# Patient Record
Sex: FEMALE | Race: WHITE | NOT HISPANIC OR LATINO | Employment: FULL TIME | URBAN - METROPOLITAN AREA
[De-identification: names, ages, dates, MRNs, and addresses within clinical notes are randomized per-mention and may not be internally consistent; named-entity substitution may affect disease eponyms.]

---

## 2017-05-24 ENCOUNTER — OFFICE VISIT (OUTPATIENT)
Dept: URGENT CARE | Age: 47
End: 2017-05-24
Payer: COMMERCIAL

## 2017-05-24 PROCEDURE — 99213 OFFICE O/P EST LOW 20 MIN: CPT | Performed by: FAMILY MEDICINE

## 2018-01-21 ENCOUNTER — OFFICE VISIT (OUTPATIENT)
Dept: URGENT CARE | Age: 48
End: 2018-01-21
Payer: COMMERCIAL

## 2018-01-21 ENCOUNTER — TRANSCRIBE ORDERS (OUTPATIENT)
Dept: ADMINISTRATIVE | Age: 48
End: 2018-01-21

## 2018-01-21 ENCOUNTER — APPOINTMENT (OUTPATIENT)
Dept: RADIOLOGY | Age: 48
End: 2018-01-21
Payer: COMMERCIAL

## 2018-01-21 DIAGNOSIS — R05.9 COUGH: Primary | ICD-10-CM

## 2018-01-21 DIAGNOSIS — R05.9 COUGH: ICD-10-CM

## 2018-01-21 PROCEDURE — 99203 OFFICE O/P NEW LOW 30 MIN: CPT | Performed by: FAMILY MEDICINE

## 2018-01-21 PROCEDURE — 71046 X-RAY EXAM CHEST 2 VIEWS: CPT

## 2018-01-22 NOTE — PROGRESS NOTES
Assessment   1  Dizziness (780 4) (R42)   2  Acute URI (465 9) (J06 9)    Plan   Acute URI    · Doxycycline Hyclate 100 MG Oral Capsule; TAKE 1 CAPSULE EVERY 12 HOURS    DAILY    Discussion/Summary   Discussion Summary:    URI: Patient was treated by her PCP on Friday with Augmentin  She is unable to finish the course due to diarrhea, stomach upset and dizziness  Advised her she can stop the medication and will start doxycycline 100 mg twice daily for 10 days  She has a side effect that she is to follow up with her primary care doctor  Understands and agrees with treatment plan: The treatment plan was reviewed with the patient/guardian  The patient/guardian understands and agrees with the treatment plan      Chief Complaint   Chief Complaint Free Text Note Form: last week had sore throat , cough, -fevers  went to doctor they gave abx  CXR RX   bad diarrhea/dizzyness--probable r/t abx  History of Present Illness   HPI: Patient presents with sensitivity to Augmentin  She states she saw her PCP on Friday who put her on Augmentin for an upper respiratory infection  She also ordered a chest x-ray which was done today  Patient states she is already starting to feel better as far as her sore throat and cough  But she is experiencing diarrhea, stomach upset and dizziness  She feels she is unable to tolerate the rest of the Augmentin  She is here requesting a different antibiotic  Hospital Based Practices Required Assessment:      Abuse And Domestic Violence Screen       Yes, the patient is safe at home  -- The patient states no one is hurting them  Depression And Suicide Screen  No, the patient has not had thoughts of hurting themself  No, the patient has not felt depressed in the past 7 days  Review of Systems   Focused-Female:      Constitutional: fever,-- feeling poorly-- and-- feeling tired  ENT: sore throat, but-- no earache        Respiratory: cough, but-- no shortness of breath-- and-- no wheezing  Gastrointestinal: nausea-- and-- diarrhea, but-- no abdominal pain-- and-- no vomiting  Active Problems   1  Migraine (346 90) (G43 909)   2  Sinusitis (473 9) (J32 9)    Past Medical History   1  History of Herniated vertebral disc (722 2)    Social History    · Never a smoker   · No alcohol use   · No drug use    Surgical History   1  History of Umbilical Hernia Repair    Current Meds    1  Cardizem SOLR;     Therapy: (Recorded:[id number]) to Recorded   2  Cymbalta CPEP; Therapy: (Recorded:[id number]) to Recorded   3  Lo Loestrin Fe 1 MG-10 MCG / 10 MCG Oral Tablet; Therapy: (Recorded:51Adr2684) to Recorded   4  Wellbutrin  MG Oral Tablet Extended Release 12 Hour; Therapy: (Recorded:20Tun1194) to Recorded    Allergies   1  Bactrim DS TABS   2  Cipro TABS    Vitals   Signs   Recorded: 21Jan2018 02:55PM   Temperature: 98 F  Heart Rate: 89  Respiration: 20  Systolic: 929  Diastolic: 78  Height: 5 ft 9 in  Weight: 158 lb   BMI Calculated: 23 33  BSA Calculated: 1 87  O2 Saturation: 98  Pain Scale: 2    Physical Exam        Constitutional      General appearance: No acute distress, well appearing and well nourished  Eyes      Conjunctiva and lids: No swelling, erythema or discharge  Ears, Nose, Mouth, and Throat      External inspection of ears and nose: Normal        Otoscopic examination: Tympanic membranes translucent with normal light reflex  Canals patent without erythema  Oropharynx: Normal with no erythema, edema, exudate or lesions  Pulmonary      Respiratory effort: No increased work of breathing or signs of respiratory distress  Auscultation of lungs: Clear to auscultation  Cardiovascular      Auscultation of heart: Normal rate and rhythm, normal S1 and S2, without murmurs         Signatures    Electronically signed by : BLANCHE Armenta; Jan 21 2018  3:31PM EST                       (Author)     Electronically signed by : Denis Butler SARAH Bertrand ; Jan 21 2018  3:57PM EST

## 2020-07-17 ENCOUNTER — OFFICE VISIT (OUTPATIENT)
Dept: ENDOCRINOLOGY | Facility: CLINIC | Age: 50
End: 2020-07-17
Payer: COMMERCIAL

## 2020-07-17 VITALS
WEIGHT: 150 LBS | HEART RATE: 97 BPM | BODY MASS INDEX: 22.22 KG/M2 | SYSTOLIC BLOOD PRESSURE: 132 MMHG | HEIGHT: 69 IN | DIASTOLIC BLOOD PRESSURE: 62 MMHG

## 2020-07-17 DIAGNOSIS — E05.90 HYPERTHYROIDISM: Primary | ICD-10-CM

## 2020-07-17 DIAGNOSIS — R51.9 CHRONIC NONINTRACTABLE HEADACHE, UNSPECIFIED HEADACHE TYPE: ICD-10-CM

## 2020-07-17 DIAGNOSIS — R74.01 TRANSAMINITIS: ICD-10-CM

## 2020-07-17 DIAGNOSIS — G89.29 CHRONIC NONINTRACTABLE HEADACHE, UNSPECIFIED HEADACHE TYPE: ICD-10-CM

## 2020-07-17 PROCEDURE — 99203 OFFICE O/P NEW LOW 30 MIN: CPT | Performed by: INTERNAL MEDICINE

## 2020-07-17 RX ORDER — GABAPENTIN 100 MG/1
CAPSULE ORAL
COMMUNITY
Start: 2017-12-08 | End: 2020-07-17 | Stop reason: ALTCHOICE

## 2020-07-17 RX ORDER — DULOXETIN HYDROCHLORIDE 60 MG/1
60 CAPSULE, DELAYED RELEASE ORAL DAILY
COMMUNITY
Start: 2014-02-24

## 2020-07-17 RX ORDER — DULOXETIN HYDROCHLORIDE 30 MG/1
CAPSULE, DELAYED RELEASE ORAL
COMMUNITY
End: 2020-07-17 | Stop reason: ALTCHOICE

## 2020-07-17 RX ORDER — BUPROPION HYDROCHLORIDE 100 MG/1
TABLET, EXTENDED RELEASE ORAL
COMMUNITY
End: 2020-07-17 | Stop reason: ALTCHOICE

## 2020-07-17 RX ORDER — BUTALBITAL, ACETAMINOPHEN AND CAFFEINE 50; 325; 40 MG/1; MG/1; MG/1
TABLET ORAL AS NEEDED
COMMUNITY
Start: 2011-09-27

## 2020-07-17 RX ORDER — ACETAZOLAMIDE 250 MG/1
TABLET ORAL
COMMUNITY
Start: 2017-12-21 | End: 2020-07-17 | Stop reason: SINTOL

## 2020-07-17 RX ORDER — METHIMAZOLE 10 MG/1
10 TABLET ORAL DAILY
Qty: 90 TABLET | Refills: 1 | Status: SHIPPED | OUTPATIENT
Start: 2020-07-17 | End: 2021-01-18 | Stop reason: DRUGHIGH

## 2020-07-17 RX ORDER — BUPROPION HYDROCHLORIDE 300 MG/1
300 TABLET ORAL
COMMUNITY

## 2020-07-17 RX ORDER — NICOTINE POLACRILEX 4 MG/1
GUM, CHEWING ORAL AS NEEDED
COMMUNITY
Start: 2015-10-06

## 2020-07-17 RX ORDER — METOPROLOL SUCCINATE 25 MG/1
12.5 TABLET, EXTENDED RELEASE ORAL DAILY
Qty: 90 TABLET | Refills: 1 | Status: SHIPPED | OUTPATIENT
Start: 2020-07-17 | End: 2021-01-28

## 2020-07-17 RX ORDER — DICLOFENAC SODIUM 75 MG/1
75 TABLET, DELAYED RELEASE ORAL
COMMUNITY
Start: 2012-06-15 | End: 2020-07-17 | Stop reason: ALTCHOICE

## 2020-07-17 RX ORDER — NORETHINDRONE ACETATE AND ETHINYL ESTRADIOL AND FERROUS FUMARATE 1MG-20(24)
KIT ORAL
COMMUNITY
Start: 2012-06-15 | End: 2020-07-17 | Stop reason: ALTCHOICE

## 2020-07-17 RX ORDER — AMOXICILLIN 500 MG/1
CAPSULE ORAL
COMMUNITY
Start: 2011-03-15 | End: 2020-07-17 | Stop reason: ALTCHOICE

## 2020-07-17 RX ORDER — DILTIAZEM HYDROCHLORIDE 100 MG/1
INJECTION, POWDER, LYOPHILIZED, FOR SOLUTION INTRAVENOUS
COMMUNITY
End: 2020-07-17 | Stop reason: ALTCHOICE

## 2020-07-17 RX ORDER — HYDROCORTISONE 25 MG/ML
2.5 LOTION TOPICAL AS NEEDED
COMMUNITY
Start: 2014-02-24

## 2020-07-17 NOTE — PATIENT INSTRUCTIONS
Start methimazole 10mg once a day for decreasing thyroid hormone level    Start metoprolol 12 5mg extended release for your heart rate    Schedule the uptake scan- stop methimazole 5 days prior to getting scan done    Have repeat labs done in 4 weeks    Follow up in 5 weeks

## 2020-07-17 NOTE — PROGRESS NOTES
New Patient Progress Note      CC: hyperthyroid    History of Present Illness:    49-year-old female with 3-4 month history of tremulousness, unintentional weight loss, episodic palpitations, dry skin, fogginess of hot and fatigue  She had a TSH done in May 2020 that was undetectable when she went to ER with symptoms of chest pain and palpitations  She denies any smoking history, family history of Graves disease, exposure to radiation, throat pain, fever, chills, difficulty swallowing or speech, No painful eye movements  She does not report similar episodes in the past     No History of external radiation to head/neck/chest   No recent Iodine loading in form of medication, erbs or kelp supplements or radiological diagnostic studies  Family Hx of thyroid cancers:  No    There is no problem list on file for this patient  Past Medical History:   Diagnosis Date    Migraines       History reviewed  No pertinent surgical history  Family History   Problem Relation Age of Onset   You Poliotilia Breast cancer Mother     Diabetes type II Maternal Grandmother      Social History     Tobacco Use    Smoking status: Never Smoker    Smokeless tobacco: Never Used   Substance Use Topics    Alcohol use: Yes     Comment: socially     Allergies   Allergen Reactions    Augmentin Es-600  [Amoxicillin-Pot Clavulanate] Diarrhea and Dizziness    Ciprofloxacin     Sulfamethoxazole-Trimethoprim      dizzy         Review of Systems   Constitutional: Positive for fatigue  HENT: Negative  Eyes: Negative  Respiratory: Negative  Cardiovascular: Negative  Gastrointestinal: Negative  Endocrine: Positive for heat intolerance  Musculoskeletal: Negative  Skin: Negative  Allergic/Immunologic: Negative  Neurological: Negative  Hematological: Negative  Psychiatric/Behavioral: Negative  All other systems reviewed and are negative  Physical Exam:  Body mass index is 22 15 kg/m²    /62   Pulse 97 Ht 5' 9" (1 753 m)   Wt 68 kg (150 lb)   BMI 22 15 kg/m²        Physical Exam   Constitutional: She is oriented to person, place, and time  She appears well-developed  HENT:   Head: Normocephalic  Mouth/Throat: Oropharynx is clear and moist    Eyes: Pupils are equal, round, and reactive to light  Neck: Normal range of motion  Very mild thyromegaly without tenderness or bruit   Cardiovascular: Normal rate and normal heart sounds  Pulmonary/Chest: Effort normal and breath sounds normal    Abdominal: Soft  Bowel sounds are normal    Musculoskeletal: She exhibits no edema or deformity  Bilateral fine tremor of hands   Neurological: She is alert and oriented to person, place, and time  Mild bilateral hyperreflexia   Skin: Capillary refill takes less than 2 seconds  No rash noted  No pallor  Psychiatric: She has a normal mood and affect  Labs:    TSH <0 007 uIU per mL   free T4 3 09 ng/dL  Thyroid peroxidase antibodies positive   AST 48 IU/L   ALT 67 IU/L    Impression:  1  Hyperthyroidism    2  Chronic nonintractable headache, unspecified headache type         Plan:    Diagnoses and all orders for this visit:    Hyperthyroidism  She has clinical and biochemical hyperthyroidism associated with positive TPO antibodies suggestive of Graves disease  Will start her on both a beta-blocker and methimazole for symptom management  Differentials including Graves disease, thyroiditis and toxic nodule were discussed  Options for definitive management including radioiodine ablation and surgery were also discussed  She was advised to get repeat labs as listed in 4 weeks and will be seen in office in 4 weeks time     -     TSH, 3rd generation; Future  -     T4, free; Future  -     T3; Future  -     Comprehensive metabolic panel; Future  -     CBC and differential Lab Collect; Future  -     Thyroid stimulating immunoglobulin; Future  -     NM thyroid imaging w uptake(s);  Future  -     metoprolol succinate (TOPROL-XL) 25 mg 24 hr tablet; Take 0 5 tablets (12 5 mg total) by mouth daily  -     ABO/Rh; Future  -     methimazole (TAPAZOLE) 10 mg tablet; Take 1 tablet (10 mg total) by mouth daily     Transaminitis  This is mild and most likely associated with Graves disease  Will need repeat CMP closely for follow-up in context of starting methimazole  Chronic nonintractable headache, unspecified headache type  -       Advised to follow-up with neurology  Discussed with the patient and all questioned fully answered  She will call me if any problems arise  Educated/ Counseled patient on diagnostic test results, prognosis, risk vs benefit of treatment options, importance of treatment compliance, healthy life and lifestyle choices        David Hayes

## 2020-12-23 ENCOUNTER — NURSE TRIAGE (OUTPATIENT)
Dept: OTHER | Facility: OTHER | Age: 50
End: 2020-12-23

## 2020-12-23 ENCOUNTER — OFFICE VISIT (OUTPATIENT)
Dept: URGENT CARE | Age: 50
End: 2020-12-23
Payer: COMMERCIAL

## 2020-12-23 VITALS — BODY MASS INDEX: 23.99 KG/M2 | WEIGHT: 162 LBS | HEIGHT: 69 IN

## 2020-12-23 DIAGNOSIS — R05.9 COUGH: Primary | ICD-10-CM

## 2020-12-23 DIAGNOSIS — J02.9 SORE THROAT: ICD-10-CM

## 2020-12-23 DIAGNOSIS — R51.9 ACUTE NONINTRACTABLE HEADACHE, UNSPECIFIED HEADACHE TYPE: ICD-10-CM

## 2020-12-23 DIAGNOSIS — Z20.822 EXPOSURE TO COVID-19 VIRUS: ICD-10-CM

## 2020-12-23 DIAGNOSIS — R53.83 FATIGUE, UNSPECIFIED TYPE: ICD-10-CM

## 2020-12-23 PROCEDURE — U0003 INFECTIOUS AGENT DETECTION BY NUCLEIC ACID (DNA OR RNA); SEVERE ACUTE RESPIRATORY SYNDROME CORONAVIRUS 2 (SARS-COV-2) (CORONAVIRUS DISEASE [COVID-19]), AMPLIFIED PROBE TECHNIQUE, MAKING USE OF HIGH THROUGHPUT TECHNOLOGIES AS DESCRIBED BY CMS-2020-01-R: HCPCS | Performed by: PHYSICIAN ASSISTANT

## 2020-12-23 PROCEDURE — 99213 OFFICE O/P EST LOW 20 MIN: CPT | Performed by: PHYSICIAN ASSISTANT

## 2020-12-25 LAB — SARS-COV-2 RNA SPEC QL NAA+PROBE: NOT DETECTED

## 2021-01-01 DIAGNOSIS — R51.9 CHRONIC NONINTRACTABLE HEADACHE, UNSPECIFIED HEADACHE TYPE: ICD-10-CM

## 2021-01-01 DIAGNOSIS — E05.90 HYPERTHYROIDISM: ICD-10-CM

## 2021-01-01 DIAGNOSIS — G89.29 CHRONIC NONINTRACTABLE HEADACHE, UNSPECIFIED HEADACHE TYPE: ICD-10-CM

## 2021-01-04 ENCOUNTER — TELEPHONE (OUTPATIENT)
Dept: ENDOCRINOLOGY | Facility: CLINIC | Age: 51
End: 2021-01-04

## 2021-01-04 DIAGNOSIS — E05.90 HYPERTHYROIDISM: Primary | ICD-10-CM

## 2021-01-04 RX ORDER — METHIMAZOLE 10 MG/1
TABLET ORAL
Qty: 30 TABLET | Refills: 5 | OUTPATIENT
Start: 2021-01-04

## 2021-01-15 ENCOUNTER — TRANSCRIBE ORDERS (OUTPATIENT)
Dept: LAB | Facility: HOSPITAL | Age: 51
End: 2021-01-15

## 2021-01-15 ENCOUNTER — TELEPHONE (OUTPATIENT)
Dept: ENDOCRINOLOGY | Facility: CLINIC | Age: 51
End: 2021-01-15

## 2021-01-15 ENCOUNTER — LAB (OUTPATIENT)
Dept: LAB | Facility: HOSPITAL | Age: 51
End: 2021-01-15
Attending: INTERNAL MEDICINE
Payer: COMMERCIAL

## 2021-01-15 DIAGNOSIS — E05.90 PRETIBIAL MYXEDEMA: Primary | ICD-10-CM

## 2021-01-15 DIAGNOSIS — E05.90 HYPERTHYROIDISM: ICD-10-CM

## 2021-01-15 LAB
ALBUMIN SERPL BCP-MCNC: 4.1 G/DL (ref 3.5–5)
ALP SERPL-CCNC: 128 U/L (ref 46–116)
ALT SERPL W P-5'-P-CCNC: 39 U/L (ref 12–78)
ANION GAP SERPL CALCULATED.3IONS-SCNC: 2 MMOL/L (ref 4–13)
AST SERPL W P-5'-P-CCNC: 19 U/L (ref 5–45)
BASOPHILS # BLD AUTO: 0.08 THOUSANDS/ΜL (ref 0–0.1)
BASOPHILS NFR BLD AUTO: 1 % (ref 0–1)
BILIRUB SERPL-MCNC: 0.33 MG/DL (ref 0.2–1)
BUN SERPL-MCNC: 14 MG/DL (ref 5–25)
CALCIUM SERPL-MCNC: 9.7 MG/DL (ref 8.3–10.1)
CHLORIDE SERPL-SCNC: 107 MMOL/L (ref 100–108)
CO2 SERPL-SCNC: 28 MMOL/L (ref 21–32)
CREAT SERPL-MCNC: 0.88 MG/DL (ref 0.6–1.3)
EOSINOPHIL # BLD AUTO: 0.41 THOUSAND/ΜL (ref 0–0.61)
EOSINOPHIL NFR BLD AUTO: 6 % (ref 0–6)
ERYTHROCYTE [DISTWIDTH] IN BLOOD BY AUTOMATED COUNT: 12.4 % (ref 11.6–15.1)
GFR SERPL CREATININE-BSD FRML MDRD: 76 ML/MIN/1.73SQ M
GLUCOSE P FAST SERPL-MCNC: 99 MG/DL (ref 65–99)
HCT VFR BLD AUTO: 41.3 % (ref 34.8–46.1)
HGB BLD-MCNC: 13.3 G/DL (ref 11.5–15.4)
IMM GRANULOCYTES # BLD AUTO: 0.01 THOUSAND/UL (ref 0–0.2)
IMM GRANULOCYTES NFR BLD AUTO: 0 % (ref 0–2)
LYMPHOCYTES # BLD AUTO: 2.3 THOUSANDS/ΜL (ref 0.6–4.47)
LYMPHOCYTES NFR BLD AUTO: 33 % (ref 14–44)
MCH RBC QN AUTO: 30.6 PG (ref 26.8–34.3)
MCHC RBC AUTO-ENTMCNC: 32.2 G/DL (ref 31.4–37.4)
MCV RBC AUTO: 95 FL (ref 82–98)
MONOCYTES # BLD AUTO: 0.4 THOUSAND/ΜL (ref 0.17–1.22)
MONOCYTES NFR BLD AUTO: 6 % (ref 4–12)
NEUTROPHILS # BLD AUTO: 3.74 THOUSANDS/ΜL (ref 1.85–7.62)
NEUTS SEG NFR BLD AUTO: 54 % (ref 43–75)
NRBC BLD AUTO-RTO: 0 /100 WBCS
PLATELET # BLD AUTO: 461 THOUSANDS/UL (ref 149–390)
PMV BLD AUTO: 9.8 FL (ref 8.9–12.7)
POTASSIUM SERPL-SCNC: 4.2 MMOL/L (ref 3.5–5.3)
PROT SERPL-MCNC: 7.7 G/DL (ref 6.4–8.2)
RBC # BLD AUTO: 4.35 MILLION/UL (ref 3.81–5.12)
SODIUM SERPL-SCNC: 137 MMOL/L (ref 136–145)
T3 SERPL-MCNC: 0.9 NG/ML (ref 0.6–1.8)
T4 FREE SERPL-MCNC: 0.72 NG/DL (ref 0.76–1.46)
TSH SERPL DL<=0.05 MIU/L-ACNC: 0.47 UIU/ML (ref 0.36–3.74)
WBC # BLD AUTO: 6.94 THOUSAND/UL (ref 4.31–10.16)

## 2021-01-15 PROCEDURE — 80053 COMPREHEN METABOLIC PANEL: CPT

## 2021-01-15 PROCEDURE — 85025 COMPLETE CBC W/AUTO DIFF WBC: CPT

## 2021-01-15 PROCEDURE — 36415 COLL VENOUS BLD VENIPUNCTURE: CPT

## 2021-01-15 PROCEDURE — 84445 ASSAY OF TSI GLOBULIN: CPT

## 2021-01-15 PROCEDURE — 84480 ASSAY TRIIODOTHYRONINE (T3): CPT

## 2021-01-15 PROCEDURE — 84443 ASSAY THYROID STIM HORMONE: CPT

## 2021-01-15 PROCEDURE — 84439 ASSAY OF FREE THYROXINE: CPT

## 2021-01-15 NOTE — TELEPHONE ENCOUNTER
----- Message from Chantel Castaneda MD sent at 1/15/2021  2:01 PM EST -----  Please call the patient regarding her thyroid blood work results, based on her results she should reduce the dose of methimazole, to 1 tablet from Monday through Thursday, and half tablet on Friday, Saturday, Sunday    She should follow-up with Dr Tres Navarro as scheduled on January 28

## 2021-01-18 DIAGNOSIS — E05.90 HYPERTHYROIDISM: Primary | ICD-10-CM

## 2021-01-18 LAB — TSI SER-ACNC: 0.27 IU/L (ref 0–0.55)

## 2021-01-18 RX ORDER — METHIMAZOLE 5 MG/1
5 TABLET ORAL DAILY
Qty: 90 TABLET | Refills: 0 | Status: SHIPPED | OUTPATIENT
Start: 2021-01-18 | End: 2021-01-28

## 2021-01-18 NOTE — TELEPHONE ENCOUNTER
What was her dose of methimazole before getting labs done?     If it was 10 mg once a day reduce to 5 mg once a day, please send me new script to sign    Keep follow-up appointment at the end of the month so we can discuss long-term management of her thyroid disorder

## 2021-01-18 NOTE — TELEPHONE ENCOUNTER
Spoke to patient, she was taking methimazole 10 mg once a day, advised script will be sent for 5 mg once a day  She understood  She also wanted to know if she should still have the [address] done?

## 2021-01-18 NOTE — TELEPHONE ENCOUNTER
Spoke to patient, went over lab results and  Dose adjustment for methimazole  Pt stated she has not been taking methimazole for a week and half  She was told that prescription was denied by the doctor because she needed an appt and labs completed prior to any refills  She has apt scheduled for the end of Jan and she assumed since the medication was not sent to pharmacy she should not be taking it  Based on labs Dr Theresa Paul suggested:  methimazole, to 1 tablet from Monday through Thursday, and half tablet on Friday, Saturday, Sunday    Pt wants to know if she should resume medication with the new dosing? If so, she will need it to sent to pharmacy

## 2021-01-28 ENCOUNTER — OFFICE VISIT (OUTPATIENT)
Dept: ENDOCRINOLOGY | Facility: CLINIC | Age: 51
End: 2021-01-28
Payer: COMMERCIAL

## 2021-01-28 VITALS
HEART RATE: 77 BPM | HEIGHT: 69 IN | SYSTOLIC BLOOD PRESSURE: 130 MMHG | BODY MASS INDEX: 23.99 KG/M2 | TEMPERATURE: 98.7 F | DIASTOLIC BLOOD PRESSURE: 76 MMHG | WEIGHT: 162 LBS

## 2021-01-28 DIAGNOSIS — E05.90 HYPERTHYROIDISM: Primary | ICD-10-CM

## 2021-01-28 PROCEDURE — 99213 OFFICE O/P EST LOW 20 MIN: CPT | Performed by: INTERNAL MEDICINE

## 2021-01-28 NOTE — PROGRESS NOTES
ENDOCRINOLOGY  FOLLOW UP VISIT      Reason for Endocrine Consult/Chief Complaint: thyroid disorder      ? Medical Decision Making:     Impression:  1  Hyperthyroidism  2  Elevated LFTs     Recommendations:        The differential includes Grave's disease vs  Thyroiditis vs  Toxic adenoma/MNG  She never completed the nuclear medicine thyroid uptake scan 7 months ago at diagnosis nor the TSI antibody so the etiology is still unclear  TFTs now normalized January 2021 with a normal TSI antibody, will stop methimazole, repeat TFTs in 2 months to make sure remains biochemically euthyroid    Can stop metoprolol as well, HR normal      Elevated LFTs-  Now normalized on labs January 2021, PCP ordered imaging of liver         RTC in 4 months  Neal TILLEY  Endocrinology        PMH- migraines  Shx- works in [id number] [employer] for [employer], also works with horses,  lives in [place] will be moving here         History of Present Illness:   Mrs Charleen Petit is a  58-year-old female who presents for thyroid disorder Follow-up  ? Events since last visit:     Never had nuclear medicine thyroid uptake scan done     She was lost to follow-up for 6 months     Remains on methimazole 5 mg once a day    On diltiazem for vestibular migraines   ? Review of Systems:     Review of Systems   Constitutional: Negative for appetite change, chills, diaphoresis, fatigue, fever and unexpected weight change  HENT: Negative for congestion, ear pain, hearing loss, rhinorrhea, sinus pressure, sinus pain, sore throat, trouble swallowing and voice change  Eyes: Negative for photophobia, redness and visual disturbance  Respiratory: Negative for apnea, cough, chest tightness, shortness of breath, wheezing and stridor  Cardiovascular: Negative for chest pain, palpitations and leg swelling  Gastrointestinal: Negative for abdominal distention, abdominal pain, constipation, diarrhea, nausea and vomiting     Endocrine: Negative for cold intolerance, heat intolerance, polydipsia, polyphagia and polyuria  Genitourinary: Negative for difficulty urinating, dysuria, flank pain, frequency, hematuria and urgency  Musculoskeletal: Negative for arthralgias, back pain, gait problem, joint swelling and myalgias  Skin: Negative for color change, pallor, rash and wound  Allergic/Immunologic: Negative for immunocompromised state  Neurological: Negative for dizziness, tremors, syncope, weakness, light-headedness and headaches  Hematological: Negative for adenopathy  Does not bruise/bleed easily  Psychiatric/Behavioral: Negative for confusion and sleep disturbance  The patient is not nervous/anxious  ?   Patient History:     Past Medical History:   Diagnosis Date    Migraines      Past Surgical History:   Procedure Laterality Date    UMBILICAL HERNIA REPAIR      1997     Social History     Socioeconomic History    Marital status: /Civil Union     Spouse name: Not on file    Number of children: Not on file    Years of education: Not on file    Highest education level: Not on file   Occupational History    Not on file   Social Needs    Financial resource strain: Not on file    Food insecurity     Worry: Not on file     Inability: Not on file   [employer] needs     Medical: Not on file     Non-medical: Not on file   Tobacco Use    Smoking status: Never Smoker    Smokeless tobacco: Never Used   Substance and Sexual Activity    Alcohol use: Yes     Comment: socially    Drug use: Never    Sexual activity: Not on file   Lifestyle    Physical activity     Days per week: Not on file     Minutes per session: Not on file    Stress: Not on file   Relationships    Social connections     Talks on phone: Not on file     Gets together: Not on file     Attends Denominational service: Not on file     Active member of club or organization: Not on file     Attends meetings of clubs or organizations: Not on file Relationship status: Not on file    Intimate partner violence     Fear of current or ex partner: Not on file     Emotionally abused: Not on file     Physically abused: Not on file     Forced sexual activity: Not on file   Other Topics Concern    Not on file   Social History Narrative    Not on file     Family History   Problem Relation Age of Onset    Breast cancer Mother     Diabetes type II Maternal Grandmother     Alzheimer's disease Paternal Grandmother        Current Medications: At the time this note was written these were the medications the patient was on  Current Outpatient Medications   Medication Sig Dispense Refill    buPROPion (WELLBUTRIN XL) 300 mg 24 hr tablet Take 300 mg by mouth      butalbital-acetaminophen-caffeine (FIORICET,ESGIC) -40 mg per tablet one every 6 hrs as needed      DULoxetine (Cymbalta) 60 mg delayed release capsule Take 60 mg by mouth      HYDROcodone-acetaminophen (ZOLVIT)  mg/15 mL solution Take 1 tablet by mouth      hydrocortisone 2 5 % lotion Apply 2 5 % topically      methimazole (TAPAZOLE) 5 mg tablet Take 1 tablet (5 mg total) by mouth daily 90 tablet 0    metoprolol succinate (TOPROL-XL) 25 mg 24 hr tablet Take 0 5 tablets (12 5 mg total) by mouth daily 90 tablet 1    Omeprazole 20 MG TBEC 1 tab daily       No current facility-administered medications for this visit  Allergies: Augmentin es-600  [amoxicillin-pot clavulanate], Banana, Ciprofloxacin, and Sulfamethoxazole-trimethoprim    Physical Exam:   Vital Signs:   /76   Pulse 77   Temp 98 7 °F (37 1 °C)   Ht 5' 9" (1 753 m)   Wt 73 5 kg (162 lb)   BMI 23 92 kg/m²     Physical Exam  Vitals signs reviewed  Constitutional:       General: She is not in acute distress  Appearance: Normal appearance  She is not ill-appearing, toxic-appearing or diaphoretic  HENT:      Head: Normocephalic and atraumatic        Right Ear: External ear normal       Left Ear: External ear normal       Nose: Nose normal    Eyes:      General: No scleral icterus  Extraocular Movements: Extraocular movements intact  Conjunctiva/sclera: Conjunctivae normal    Neck:      Musculoskeletal: Normal range of motion and neck supple  No muscular tenderness  Comments: +mild diffuse thyromegaly  Cardiovascular:      Rate and Rhythm: Normal rate and regular rhythm  Heart sounds: Normal heart sounds  No murmur  No friction rub  No gallop  Pulmonary:      Effort: Pulmonary effort is normal  No respiratory distress  Breath sounds: Normal breath sounds  No stridor  No wheezing, rhonchi or rales  Abdominal:      General: Bowel sounds are normal  There is no distension  Palpations: Abdomen is soft  There is no mass  Tenderness: There is no abdominal tenderness  There is no guarding or rebound  Hernia: No hernia is present  Musculoskeletal: Normal range of motion  General: No swelling  Lymphadenopathy:      Cervical: No cervical adenopathy  Skin:     General: Skin is warm and dry  Coloration: Skin is not pale  Findings: No erythema or rash  Neurological:      General: No focal deficit present  Mental Status: She is alert and oriented to person, place, and time  Comments: +mild hand tremor b/l on arm extension    Psychiatric:         Mood and Affect: Mood normal          Behavior: Behavior normal          Thought Content:  Thought content normal          Judgment: Judgment normal             Labs and Imaging:      Component      Latest Ref Rng & Units 1/15/2021   WBC      4 31 - 10 16 Thousand/uL 6 94   Red Blood Cell Count      3 81 - 5 12 Million/uL 4 35   Hemoglobin      11 5 - 15 4 g/dL 13 3   HCT      34 8 - 46 1 % 41 3   MCV      82 - 98 fL 95   MCH      26 8 - 34 3 pg 30 6   MCHC      31 4 - 37 4 g/dL 32 2   RDW      11 6 - 15 1 % 12 4   MPV      8 9 - 12 7 fL 9 8   Platelet Count      581 - 390 Thousands/uL 461 (H)   nRBC      /100 WBCs 0   Neutrophils %      43 - 75 % 54   Immat GRANS %      0 - 2 % 0   Lymphocytes Relative      14 - 44 % 33   Monocytes Relative      4 - 12 % 6   Eosinophils      0 - 6 % 6   Basophils Relative      0 - 1 % 1   Absolute Neutrophils      1 85 - 7 62 Thousands/µL 3 74   Immature Grans Absolute      0 00 - 0 20 Thousand/uL 0 01   Lymphocytes Absolute      0 60 - 4 47 Thousands/µL 2 30   Absolute Monocytes      0 17 - 1 22 Thousand/µL 0 40   Absolute Eosinophils      0 00 - 0 61 Thousand/µL 0 41   Basophils Absolute      0 00 - 0 10 Thousands/µL 0 08   Sodium      136 - 145 mmol/L 137   Potassium      3 5 - 5 3 mmol/L 4 2   Chloride      100 - 108 mmol/L 107   CO2      21 - 32 mmol/L 28   Anion Gap      4 - 13 mmol/L 2 (L)   BUN      5 - 25 mg/dL 14   Creatinine      0 60 - 1 30 mg/dL 0 88   GLUCOSE FASTING      65 - 99 mg/dL 99   Calcium      8 3 - 10 1 mg/dL 9 7   AST      5 - 45 U/L 19   ALT      12 - 78 U/L 39   Alkaline Phosphatase      46 - 116 U/L 128 (H)   Total Protein      6 4 - 8 2 g/dL 7 7   Albumin      3 5 - 5 0 g/dL 4 1   TOTAL BILIRUBIN      0 20 - 1 00 mg/dL 0 33   eGFR      ml/min/1 73sq m 76   THYROID STIMULATING IMMUNOGLOBULIN      0 00 - 0 55 IU/L 0 27   TSH 3RD GENERATON      0 358 - 3 740 uIU/mL 0 471   Free T4      0 76 - 1 46 ng/dL 0 72 (L)   T3, Total      0 60 - 1 80 ng/mL 0 90

## 2021-04-21 NOTE — PROGRESS NOTES
Cardiology Outpatient Consult Note - Gopal Aguilar 44 y o  female MRN: [id number]    Encounter: [id number]      Assessment/Plan:    There are no active problems to display for this patient  Left atrial enlargement    Studies- personally reviewed by me    EKG 4/22/21: Sinus rhythm, biatrial enlargement, nonspecific T wave changes  Normal voltage  No significant change from prior outside EKG on 7/1/20    TTE 7/2/20 [facility]  LVEF 60-65%  LVIDD 4 3cm  Mild LVH  Mild concentric hypertrophy  RV normal size and systolic function  Severe LA enlargement  PASP 31mmHg  Hyperthyroidism  TFTs normalzied in 1/21 and stopped methimazole  Follows with endocrinology  Palpitations, shortness of breath  Likely related to hyperthyroidism, resolved  Migraine  No recent flares    Echocardiogram obtained during time when patient was diagnosed with hyperthyroidism and has not initiated treatment and was having symptoms of chest pain, palpitations and shortness of breath  Patient is currently asymptomatic  Patient with persistent biatrial enlargement on EKG  No other significant structural or valvular abnormalities on echocardiogram  We will obtain extended ambulatory holter monitor to evaluate for occult paroxysmal atrial fibrillation  Will repeat echocardiogram in 3 months to reassess with stabilization/resolution of hyperthyroidism      HPI:   44year old female with PMH of migraine and hyperthyroidism who is here for evaluation of an abnormal echocardiogram   Patient had echocardiogram in July of 2020 which showed severe left atrial enlargement, normal biventricular systolic function and no hemodynamically significant valvular abnormalities  She was being evaluated for shortness of breath and chest pain at that time and had an EKG which showed biatrial enlargement  She was diagnosed with hyperthyroidism at that time    Unclear etiology but she was started on methimazole would subsequent normalization of thyroid function test   Methimazole was discontinued in January 2021  She reports overall feeling well now  She rides horses 3 to 4 times a week, does Pilates for 30 minutes a couple times a week without symptoms  Reports family history of hypertension in her grandmother, no premature coronary artery disease or sudden death  Past Medical History:   Diagnosis Date    Migraines    Herniated disc    Review of Systems   Constitutional: Negative for chills and fever  HENT: Negative for ear pain and sore throat  Eyes: Negative for pain and visual disturbance  Respiratory: Negative for cough and shortness of breath  Cardiovascular: Negative for chest pain and palpitations  Gastrointestinal: Negative for abdominal pain and vomiting  Genitourinary: Negative for dysuria and hematuria  Musculoskeletal: Negative for arthralgias and back pain  Skin: Negative for color change and rash  Neurological: Negative for seizures and syncope  All other systems reviewed and are negative  Allergies   Allergen Reactions    Augmentin Es-600  [Amoxicillin-Pot Clavulanate] Diarrhea and Dizziness    Banana - Food Allergy      Tummy upset and diarrhea    Ciprofloxacin     Sulfamethoxazole-Trimethoprim      dizzy             Current Outpatient Medications:     Botox 200 units SOLR, , Disp: , Rfl:     buPROPion (WELLBUTRIN XL) 300 mg 24 hr tablet, Take 300 mg by mouth, Disp: , Rfl:     Cyanocobalamin (VITAMIN B 12 PO), Take by mouth daily, Disp: , Rfl:     diltiazem (CARDIZEM CD) 180 mg 24 hr capsule, , Disp: , Rfl:     DULoxetine (Cymbalta) 60 mg delayed release capsule, Take 60 mg by mouth, Disp: , Rfl:     HYDROcodone-acetaminophen (ZOLVIT)  mg/15 mL solution, Take 1 tablet by mouth as needed , Disp: , Rfl:     hydrocortisone 2 5 % lotion, Apply 2 5 % topically, Disp: , Rfl:     Omeprazole 20 MG TBEC, as needed , Disp: , Rfl:     butalbital-acetaminophen-caffeine (FIORICET,ESGIC) -40 mg per tablet, one every 6 hrs as needed, Disp: , Rfl:     Social History     Socioeconomic History    Marital status: /Civil Union     Spouse name: Not on file    Number of children: Not on file    Years of education: Not on file    Highest education level: Not on file   Occupational History    Not on file   Social Needs    Financial resource strain: Not on file    Food insecurity     Worry: Not on file     Inability: Not on file   [employer] needs     Medical: Not on file     Non-medical: Not on file   Tobacco Use    Smoking status: Never Smoker    Smokeless tobacco: Never Used   Substance and Sexual Activity    Alcohol use: Yes     Comment: socially    Drug use: Never    Sexual activity: Not on file   Lifestyle    Physical activity     Days per week: Not on file     Minutes per session: Not on file    Stress: Not on file   Relationships    Social connections     Talks on phone: Not on file     Gets together: Not on file     Attends Sikhism service: Not on file     Active member of club or organization: Not on file     Attends meetings of clubs or organizations: Not on file     Relationship status: Not on file    Intimate partner violence     Fear of current or ex partner: Not on file     Emotionally abused: Not on file     Physically abused: Not on file     Forced sexual activity: Not on file   Other Topics Concern    Not on file   Social History Narrative    Not on file       Family History   Problem Relation Age of Onset    Breast cancer Mother     Diabetes type II Maternal Grandmother     Alzheimer's disease Paternal Grandmother        Physical Exam:    Vitals: Blood pressure 110/70, pulse 81, height 5' 9" (1 753 m), weight 76 9 kg (169 lb 9 6 oz), SpO2 96 %  , Body mass index is 25 05 kg/m² ,   Wt Readings from Last 3 Encounters:   04/22/21 76 9 kg (169 lb 9 6 oz)   01/28/21 73 5 kg (162 lb)   12/23/20 73 5 kg (162 lb)       Physical Exam  Constitutional:       General: She is not in acute distress  Appearance: Normal appearance  HENT:      Head: Normocephalic and atraumatic  Mouth/Throat:      Mouth: Mucous membranes are moist    Eyes:      Extraocular Movements: Extraocular movements intact  Conjunctiva/sclera: Conjunctivae normal    Neck:      Musculoskeletal: Neck supple  Cardiovascular:      Rate and Rhythm: Normal rate and regular rhythm  Pulses: Normal pulses  Heart sounds: No murmur  No friction rub  No gallop  Comments: Nonelevated JVP  Pulmonary:      Effort: Pulmonary effort is normal  No respiratory distress  Breath sounds: No wheezing, rhonchi or rales  Abdominal:      General: There is no distension  Palpations: Abdomen is soft  Tenderness: There is no abdominal tenderness  There is no guarding  Musculoskeletal:         General: No deformity or signs of injury  Right lower leg: No edema  Left lower leg: No edema  Skin:     General: Skin is warm and dry  Capillary Refill: Capillary refill takes less than 2 seconds  Neurological:      General: No focal deficit present  Mental Status: She is alert and oriented to person, place, and time  Psychiatric:         Mood and Affect: Mood normal          Labs & Results:    Lab Results   Component Value Date    WBC 6 94 01/15/2021    HGB 13 3 01/15/2021    HCT 41 3 01/15/2021    MCV 95 01/15/2021     (H) 01/15/2021     Lab Results   Component Value Date    SODIUM 137 01/15/2021    K 4 2 01/15/2021     01/15/2021    CO2 28 01/15/2021    BUN 14 01/15/2021    CREATININE 0 88 01/15/2021    CALCIUM 9 7 01/15/2021     No results found for: NTBNP   No results found for: CHOLESTEROL  No results found for: HDL  No results found for: TRIG  No results found for: Silvia    EKG personally reviewed by Love Ding MD      Counseling / Coordination of Care  Time spent today 40 minutes    Greater than 50% of total time was spent with the patient and / or family counseling and / or coordination of care  We discussed diagnoses, most recent studies and any changes in treatment    Thank you for the opportunity to participate in the care of this patient      Glenis Martinez MD  ADVANCED HEART FAILURE AND MECHANICAL CIRCULATORY SUPPORT  [facility]

## 2021-04-22 ENCOUNTER — OFFICE VISIT (OUTPATIENT)
Dept: CARDIOLOGY CLINIC | Facility: CLINIC | Age: 51
End: 2021-04-22
Payer: COMMERCIAL

## 2021-04-22 VITALS
SYSTOLIC BLOOD PRESSURE: 110 MMHG | OXYGEN SATURATION: 96 % | DIASTOLIC BLOOD PRESSURE: 70 MMHG | WEIGHT: 169.6 LBS | BODY MASS INDEX: 25.12 KG/M2 | HEART RATE: 81 BPM | HEIGHT: 69 IN

## 2021-04-22 DIAGNOSIS — R00.2 PALPITATIONS: ICD-10-CM

## 2021-04-22 DIAGNOSIS — I51.7 LEFT ATRIAL ENLARGEMENT: Primary | ICD-10-CM

## 2021-04-22 DIAGNOSIS — E05.90 HYPERTHYROIDISM: ICD-10-CM

## 2021-04-22 PROCEDURE — 99204 OFFICE O/P NEW MOD 45 MIN: CPT | Performed by: INTERNAL MEDICINE

## 2021-04-22 PROCEDURE — 93000 ELECTROCARDIOGRAM COMPLETE: CPT | Performed by: INTERNAL MEDICINE

## 2021-04-22 RX ORDER — DILTIAZEM HYDROCHLORIDE 180 MG/1
180 CAPSULE, COATED, EXTENDED RELEASE ORAL DAILY
COMMUNITY
Start: 2021-04-19 | End: 2022-03-11 | Stop reason: SDUPTHER

## 2021-04-22 RX ORDER — ONABOTULINUMTOXINA 200 [USP'U]/1
INJECTION, POWDER, LYOPHILIZED, FOR SOLUTION INTRADERMAL; INTRAMUSCULAR
COMMUNITY
Start: 2021-04-14

## 2021-06-15 ENCOUNTER — OFFICE VISIT (OUTPATIENT)
Dept: ENDOCRINOLOGY | Facility: CLINIC | Age: 51
End: 2021-06-15
Payer: COMMERCIAL

## 2021-06-15 VITALS
DIASTOLIC BLOOD PRESSURE: 76 MMHG | TEMPERATURE: 98.7 F | HEART RATE: 88 BPM | BODY MASS INDEX: 24.14 KG/M2 | WEIGHT: 163 LBS | SYSTOLIC BLOOD PRESSURE: 120 MMHG | HEIGHT: 69 IN

## 2021-06-15 DIAGNOSIS — E05.90 HYPERTHYROIDISM: Primary | ICD-10-CM

## 2021-06-15 PROCEDURE — 99214 OFFICE O/P EST MOD 30 MIN: CPT | Performed by: INTERNAL MEDICINE

## 2021-06-15 NOTE — PROGRESS NOTES
ENDOCRINOLOGY  FOLLOW UP VISIT      Reason for Endocrine Consult/Chief Complaint: thyroid disorder follow up  ? Medical Decision Making:     Impression:  1  Hyperthyroidism now resolved, unclear etiology, TPO Ab +  2  Elevated LFTs     Recommendations:        The differential includes Grave's disease vs  Thyroiditis vs  Toxic adenoma/MNG  She never completed the nuclear medicine thyroid uptake scan in mid-2020 at diagnosis nor the TSI antibody so the etiology is still unclear      TFTs normalized January 2021 with a normal TSI antibody, remains off methimazole, repeat TFTs now to make sure remains biochemically euthyroid  She will forward labs done by PCP 2 months ago- if TFTs normal will repeat them then in July/August 2021    Elevated LFTs-  Now normalized on labs January 2021, managed by PCP         RTC in 4 months  Loly TILLEY  Endocrinology    A total of 35 minutes was spent on this encounter including taking history, performing physical exam, discussing work up, charting         PMH- migraines  Shx- works in [address] for [employer], also works with horses,  lives in [place]            History of Present Illness:   Mrs Kimberly Rockwell is a  52-year-old female who presents for thyroid disorder Follow-up  ?        Events since last visit:     Off methimazole, overdue for labs   No hyperthyroid symptoms at this time      Dealing with migraines looking for new neurologist for restless leg syndrome    Sees cardiologist needs to get holter monitor testing done   ? Review of Systems:     Review of Systems   Constitutional: Negative for appetite change, chills, diaphoresis, fatigue, fever and unexpected weight change  HENT: Negative for congestion, ear pain, hearing loss, rhinorrhea, sinus pressure, sinus pain, sore throat, trouble swallowing and voice change  Eyes: Negative for photophobia, redness and visual disturbance     Respiratory: Negative for apnea, cough, chest tightness, shortness of breath, wheezing and stridor  Cardiovascular: Negative for chest pain, palpitations and leg swelling  Gastrointestinal: Negative for abdominal distention, abdominal pain, constipation, diarrhea, nausea and vomiting  Endocrine: Negative for cold intolerance, heat intolerance, polydipsia, polyphagia and polyuria  Genitourinary: Negative for difficulty urinating, dysuria, flank pain, frequency, hematuria and urgency  Musculoskeletal: Negative for arthralgias, back pain, gait problem, joint swelling and myalgias  Skin: Negative for color change, pallor, rash and wound  Allergic/Immunologic: Negative for immunocompromised state  Neurological: Negative for dizziness, tremors, syncope, weakness, light-headedness and headaches  Hematological: Negative for adenopathy  Does not bruise/bleed easily  Psychiatric/Behavioral: Negative for confusion and sleep disturbance  The patient is not nervous/anxious  ?   Patient History:     Past Medical History:   Diagnosis Date    Migraines      Past Surgical History:   Procedure Laterality Date    UMBILICAL HERNIA REPAIR      1997     Social History     Socioeconomic History    Marital status: /Civil Union     Spouse name: Not on file    Number of children: Not on file    Years of education: Not on file    Highest education level: Not on file   Occupational History    Not on file   Tobacco Use    Smoking status: Never Smoker    Smokeless tobacco: Never Used   Vaping Use    Vaping Use: Never used   Substance and Sexual Activity    Alcohol use: Yes     Comment: socially    Drug use: Never    Sexual activity: Not on file   Other Topics Concern    Not on file   Social History Narrative    Not on file     Social Determinants of Health     Financial Resource Strain:     Difficulty of Paying Living Expenses:    Food Insecurity:     Worried About Running Out of Food in the Last Year:     [address] in the Last Year: Transportation Needs:     Lack of Transportation (Medical):  Lack of Transportation (Non-Medical):    Physical Activity:     Days of Exercise per Week:     Minutes of Exercise per Session:    Stress:     Feeling of Stress :    Social Connections:     Frequency of Communication with Friends and Family:     Frequency of Social Gatherings with Friends and Family:     Attends Christianity Services:     Active Member of Clubs or Organizations:     Attends Club or Organization Meetings:     Marital Status:    Intimate Partner Violence:     Fear of Current or Ex-Partner:     Emotionally Abused:     Physically Abused:     Sexually Abused:      Family History   Problem Relation Age of Onset    Breast cancer Mother     Diabetes type II Maternal Grandmother     Alzheimer's disease Paternal Grandmother        Current Medications: At the time this note was written these were the medications the patient was on  Current Outpatient Medications   Medication Sig Dispense Refill    Botox 200 units SOLR       buPROPion (WELLBUTRIN XL) 300 mg 24 hr tablet Take 300 mg by mouth      butalbital-acetaminophen-caffeine (FIORICET,ESGIC) -40 mg per tablet one every 6 hrs as needed      Cyanocobalamin (VITAMIN B 12 PO) Take by mouth daily      diltiazem (CARDIZEM CD) 180 mg 24 hr capsule       DULoxetine (Cymbalta) 60 mg delayed release capsule Take 60 mg by mouth      HYDROcodone-acetaminophen (ZOLVIT)  mg/15 mL solution Take 1 tablet by mouth as needed       hydrocortisone 2 5 % lotion Apply 2 5 % topically      Omeprazole 20 MG TBEC as needed        No current facility-administered medications for this visit         Allergies: Augmentin es-600  [amoxicillin-pot clavulanate], Banana - food allergy, Ciprofloxacin, and Sulfamethoxazole-trimethoprim    Physical Exam:   Vital Signs:   /76   Pulse 88   Temp 98 7 °F (37 1 °C)   Ht 5' 9" (1 753 m)   Wt 73 9 kg (163 lb)   BMI 24 07 kg/m² Physical Exam  Vitals reviewed  Constitutional:       General: She is not in acute distress  Appearance: Normal appearance  She is not ill-appearing, toxic-appearing or diaphoretic  HENT:      Head: Normocephalic and atraumatic  Right Ear: External ear normal       Left Ear: External ear normal       Nose: Nose normal    Eyes:      General: No scleral icterus  Extraocular Movements: Extraocular movements intact  Conjunctiva/sclera: Conjunctivae normal    Neck:      Comments: No thyromegaly or nodules  Cardiovascular:      Rate and Rhythm: Normal rate and regular rhythm  Heart sounds: Normal heart sounds  No murmur heard  No friction rub  No gallop  Pulmonary:      Effort: Pulmonary effort is normal  No respiratory distress  Breath sounds: Normal breath sounds  No stridor  No wheezing, rhonchi or rales  Abdominal:      General: Bowel sounds are normal  There is no distension  Palpations: Abdomen is soft  There is no mass  Tenderness: There is no abdominal tenderness  There is no guarding or rebound  Hernia: No hernia is present  Musculoskeletal:         General: No swelling  Normal range of motion  Cervical back: Normal range of motion and neck supple  No tenderness  Lymphadenopathy:      Cervical: No cervical adenopathy  Skin:     General: Skin is warm and dry  Coloration: Skin is not pale  Findings: No erythema or rash  Neurological:      General: No focal deficit present  Mental Status: She is alert and oriented to person, place, and time  Psychiatric:         Mood and Affect: Mood normal          Behavior: Behavior normal          Thought Content:  Thought content normal          Judgment: Judgment normal             Labs and Imaging:    No recent labs

## 2021-07-06 ENCOUNTER — APPOINTMENT (OUTPATIENT)
Dept: LAB | Age: 51
End: 2021-07-06
Payer: COMMERCIAL

## 2021-07-06 DIAGNOSIS — E05.90 HYPERTHYROIDISM: ICD-10-CM

## 2021-07-06 LAB
T3 SERPL-MCNC: 1.1 NG/ML (ref 0.6–1.8)
T4 FREE SERPL-MCNC: 0.72 NG/DL (ref 0.76–1.46)
TSH SERPL DL<=0.05 MIU/L-ACNC: 0.28 UIU/ML (ref 0.36–3.74)

## 2021-07-06 PROCEDURE — 84443 ASSAY THYROID STIM HORMONE: CPT

## 2021-07-06 PROCEDURE — 84480 ASSAY TRIIODOTHYRONINE (T3): CPT

## 2021-07-06 PROCEDURE — 84439 ASSAY OF FREE THYROXINE: CPT

## 2021-07-06 PROCEDURE — 36415 COLL VENOUS BLD VENIPUNCTURE: CPT

## 2021-07-08 ENCOUNTER — TELEPHONE (OUTPATIENT)
Dept: ENDOCRINOLOGY | Facility: CLINIC | Age: 51
End: 2021-07-08

## 2021-07-08 DIAGNOSIS — E05.90 HYPERTHYROIDISM: Primary | ICD-10-CM

## 2021-07-08 NOTE — TELEPHONE ENCOUNTER
Patient called for her latest lab results  She noticed her thyroid is abnormal   Should she begin medication again?

## 2021-07-09 NOTE — TELEPHONE ENCOUNTER
I would not recommend restarting methimazole  She can resume metoprolol 12 5mg extended release once a day if she has adrenergic symptoms  Still recommend repeat labs in 3-4 weeks

## 2021-07-09 NOTE — TELEPHONE ENCOUNTER
Patient informed of labs results and no need for medication  Pt stated that she was feeling really crappy, shaking a lot, dizzy, nausea and vertigo through out the day  So she started taking  methimazole 5 mg 1 tablet daily and metoprolol 25 mg 1/3 tablet daily 3 days ago around 7/5/2  since being back on medication she is feeling better

## 2021-07-09 NOTE — TELEPHONE ENCOUNTER
Her TSH was mildly low 0 285 but her free T4 was also mildly low 0 72  no need for medication right now  Please repeat TSH, Free T4, Free T4 by dialysis method, Total T3, CBC w/diff, CMP in 3-4 weeks for re-assessment  Will also check TSI antibody       Avoid multivitamins or biotin containing supplements at least 3 days before getting labs done

## 2021-07-09 NOTE — TELEPHONE ENCOUNTER
Left patient detailed message regarding methimazole and metoprolol, repeat labs in 3-4 weeks  Advised to call office and let us know she received the message

## 2021-09-27 ENCOUNTER — APPOINTMENT (OUTPATIENT)
Dept: LAB | Age: 51
End: 2021-09-27
Payer: COMMERCIAL

## 2021-09-27 DIAGNOSIS — E05.90 HYPERTHYROIDISM: ICD-10-CM

## 2021-09-27 LAB
ALBUMIN SERPL BCP-MCNC: 3.9 G/DL (ref 3.5–5)
ALP SERPL-CCNC: 108 U/L (ref 46–116)
ALT SERPL W P-5'-P-CCNC: 50 U/L (ref 12–78)
ANION GAP SERPL CALCULATED.3IONS-SCNC: 3 MMOL/L (ref 4–13)
AST SERPL W P-5'-P-CCNC: 21 U/L (ref 5–45)
BASOPHILS # BLD AUTO: 0.08 THOUSANDS/ΜL (ref 0–0.1)
BASOPHILS NFR BLD AUTO: 1 % (ref 0–1)
BILIRUB SERPL-MCNC: 0.18 MG/DL (ref 0.2–1)
BUN SERPL-MCNC: 16 MG/DL (ref 5–25)
CALCIUM SERPL-MCNC: 9.2 MG/DL (ref 8.3–10.1)
CHLORIDE SERPL-SCNC: 106 MMOL/L (ref 100–108)
CO2 SERPL-SCNC: 28 MMOL/L (ref 21–32)
CREAT SERPL-MCNC: 0.87 MG/DL (ref 0.6–1.3)
EOSINOPHIL # BLD AUTO: 0.3 THOUSAND/ΜL (ref 0–0.61)
EOSINOPHIL NFR BLD AUTO: 4 % (ref 0–6)
ERYTHROCYTE [DISTWIDTH] IN BLOOD BY AUTOMATED COUNT: 12.2 % (ref 11.6–15.1)
GFR SERPL CREATININE-BSD FRML MDRD: 76 ML/MIN/1.73SQ M
GLUCOSE SERPL-MCNC: 102 MG/DL (ref 65–140)
HCT VFR BLD AUTO: 43.8 % (ref 34.8–46.1)
HGB BLD-MCNC: 14.3 G/DL (ref 11.5–15.4)
IMM GRANULOCYTES # BLD AUTO: 0.03 THOUSAND/UL (ref 0–0.2)
IMM GRANULOCYTES NFR BLD AUTO: 0 % (ref 0–2)
LYMPHOCYTES # BLD AUTO: 3.22 THOUSANDS/ΜL (ref 0.6–4.47)
LYMPHOCYTES NFR BLD AUTO: 40 % (ref 14–44)
MCH RBC QN AUTO: 30 PG (ref 26.8–34.3)
MCHC RBC AUTO-ENTMCNC: 32.6 G/DL (ref 31.4–37.4)
MCV RBC AUTO: 92 FL (ref 82–98)
MONOCYTES # BLD AUTO: 0.52 THOUSAND/ΜL (ref 0.17–1.22)
MONOCYTES NFR BLD AUTO: 7 % (ref 4–12)
NEUTROPHILS # BLD AUTO: 3.88 THOUSANDS/ΜL (ref 1.85–7.62)
NEUTS SEG NFR BLD AUTO: 48 % (ref 43–75)
NRBC BLD AUTO-RTO: 0 /100 WBCS
PLATELET # BLD AUTO: 427 THOUSANDS/UL (ref 149–390)
PMV BLD AUTO: 10.2 FL (ref 8.9–12.7)
POTASSIUM SERPL-SCNC: 4.1 MMOL/L (ref 3.5–5.3)
PROT SERPL-MCNC: 7.9 G/DL (ref 6.4–8.2)
RBC # BLD AUTO: 4.77 MILLION/UL (ref 3.81–5.12)
SODIUM SERPL-SCNC: 137 MMOL/L (ref 136–145)
T3 SERPL-MCNC: 0.8 NG/ML (ref 0.6–1.8)
T4 FREE SERPL-MCNC: 0.72 NG/DL (ref 0.76–1.46)
TSH SERPL DL<=0.05 MIU/L-ACNC: 0.34 UIU/ML (ref 0.36–3.74)
WBC # BLD AUTO: 8.03 THOUSAND/UL (ref 4.31–10.16)

## 2021-09-27 PROCEDURE — 84480 ASSAY TRIIODOTHYRONINE (T3): CPT

## 2021-09-27 PROCEDURE — 85025 COMPLETE CBC W/AUTO DIFF WBC: CPT

## 2021-09-27 PROCEDURE — 84443 ASSAY THYROID STIM HORMONE: CPT

## 2021-09-27 PROCEDURE — 84445 ASSAY OF TSI GLOBULIN: CPT

## 2021-09-27 PROCEDURE — 80053 COMPREHEN METABOLIC PANEL: CPT

## 2021-09-27 PROCEDURE — 84439 ASSAY OF FREE THYROXINE: CPT

## 2021-09-27 PROCEDURE — 36415 COLL VENOUS BLD VENIPUNCTURE: CPT

## 2021-09-29 ENCOUNTER — TELEPHONE (OUTPATIENT)
Dept: ENDOCRINOLOGY | Facility: CLINIC | Age: 51
End: 2021-09-29

## 2021-09-29 NOTE — TELEPHONE ENCOUNTER
----- Message from Lidia Rand MD sent at 9/28/2021  4:42 PM EDT -----  Please call the patient regarding her abnormal result  Please inform patient that free T4 is still low,  TSH is improving  Sometimes free T4 could be not reliable from regular assay  Please order free T4 by equilibrium dialysis  She should do blood work 1 week before her appointment, repeat TSH as well    Thanks

## 2021-09-29 NOTE — TELEPHONE ENCOUNTER
Spoke with patient and reviewed her lab results  She understood    Sent labwork to be done one week before appointment

## 2021-09-30 LAB — TSI SER-ACNC: 0.38 IU/L (ref 0–0.55)

## 2021-10-01 ENCOUNTER — HOSPITAL ENCOUNTER (OUTPATIENT)
Dept: NON INVASIVE DIAGNOSTICS | Facility: HOSPITAL | Age: 51
Discharge: HOME | End: 2021-10-01
Attending: INTERNAL MEDICINE
Payer: COMMERCIAL

## 2021-10-01 DIAGNOSIS — R00.2 PALPITATIONS: ICD-10-CM

## 2021-10-01 DIAGNOSIS — E05.90 HYPERTHYROIDISM: ICD-10-CM

## 2021-10-01 DIAGNOSIS — I51.7 LEFT ATRIAL ENLARGEMENT: ICD-10-CM

## 2021-10-01 PROCEDURE — 93306 TTE W/DOPPLER COMPLETE: CPT

## 2021-10-01 PROCEDURE — 93306 TTE W/DOPPLER COMPLETE: CPT | Performed by: INTERNAL MEDICINE

## 2021-10-04 ENCOUNTER — OFFICE VISIT (OUTPATIENT)
Dept: CARDIOLOGY CLINIC | Facility: CLINIC | Age: 51
End: 2021-10-04
Payer: COMMERCIAL

## 2021-10-04 VITALS
SYSTOLIC BLOOD PRESSURE: 114 MMHG | WEIGHT: 172.3 LBS | HEART RATE: 72 BPM | BODY MASS INDEX: 25.52 KG/M2 | HEIGHT: 69 IN | DIASTOLIC BLOOD PRESSURE: 66 MMHG

## 2021-10-04 DIAGNOSIS — I51.7 LEFT VENTRICULAR HYPERTROPHY: Primary | ICD-10-CM

## 2021-10-04 DIAGNOSIS — R00.2 PALPITATIONS: ICD-10-CM

## 2021-10-04 DIAGNOSIS — E05.90 HYPERTHYROIDISM: ICD-10-CM

## 2021-10-04 DIAGNOSIS — I42.2 HYPERTROPHIC CARDIOMYOPATHY (HCC): ICD-10-CM

## 2021-10-04 PROCEDURE — 99214 OFFICE O/P EST MOD 30 MIN: CPT | Performed by: INTERNAL MEDICINE

## 2021-10-04 RX ORDER — DILTIAZEM HYDROCHLORIDE 240 MG/1
180 CAPSULE, EXTENDED RELEASE ORAL DAILY
COMMUNITY
End: 2022-03-11 | Stop reason: SDUPTHER

## 2021-10-06 ENCOUNTER — CLINICAL SUPPORT (OUTPATIENT)
Dept: CARDIOLOGY CLINIC | Facility: CLINIC | Age: 51
End: 2021-10-06
Payer: COMMERCIAL

## 2021-10-06 DIAGNOSIS — R00.2 PALPITATIONS: ICD-10-CM

## 2021-10-06 DIAGNOSIS — I51.7 LEFT ATRIAL ENLARGEMENT: ICD-10-CM

## 2021-10-06 LAB — MISCELLANEOUS LAB TEST RESULT: NORMAL

## 2021-10-06 PROCEDURE — 93248 EXT ECG>7D<15D REV&INTERPJ: CPT | Performed by: INTERNAL MEDICINE

## 2021-10-08 ENCOUNTER — TELEPHONE (OUTPATIENT)
Dept: CARDIOLOGY CLINIC | Facility: CLINIC | Age: 51
End: 2021-10-08

## 2021-10-25 ENCOUNTER — HOSPITAL ENCOUNTER (OUTPATIENT)
Dept: RADIOLOGY | Facility: HOSPITAL | Age: 51
Discharge: HOME | End: 2021-10-25
Attending: INTERNAL MEDICINE
Payer: COMMERCIAL

## 2021-10-25 DIAGNOSIS — I51.7 LEFT VENTRICULAR HYPERTROPHY: ICD-10-CM

## 2021-10-25 PROCEDURE — G1004 CDSM NDSC: HCPCS

## 2021-10-25 PROCEDURE — A9585 GADOBUTROL INJECTION: HCPCS | Performed by: INTERNAL MEDICINE

## 2021-10-25 PROCEDURE — 75561 CARDIAC MRI FOR MORPH W/DYE: CPT

## 2021-10-25 RX ADMIN — GADOBUTROL 15 ML: 604.72 INJECTION INTRAVENOUS at 09:11

## 2021-10-29 ENCOUNTER — OFFICE VISIT (OUTPATIENT)
Dept: ENDOCRINOLOGY | Facility: CLINIC | Age: 51
End: 2021-10-29
Payer: COMMERCIAL

## 2021-10-29 VITALS
HEIGHT: 69 IN | DIASTOLIC BLOOD PRESSURE: 70 MMHG | TEMPERATURE: 97 F | WEIGHT: 171 LBS | SYSTOLIC BLOOD PRESSURE: 118 MMHG | HEART RATE: 84 BPM | BODY MASS INDEX: 25.33 KG/M2

## 2021-10-29 DIAGNOSIS — Z86.39 H/O HYPERTHYROIDISM: ICD-10-CM

## 2021-10-29 DIAGNOSIS — R94.6 ABNORMAL THYROID FUNCTION TEST: Primary | ICD-10-CM

## 2021-10-29 PROCEDURE — 99214 OFFICE O/P EST MOD 30 MIN: CPT | Performed by: INTERNAL MEDICINE

## 2021-10-29 RX ORDER — HYDROCODONE BITARTRATE AND ACETAMINOPHEN 5; 325 MG/1; MG/1
TABLET ORAL
COMMUNITY
Start: 2021-10-26

## 2021-11-10 ENCOUNTER — TELEPHONE (OUTPATIENT)
Dept: CARDIOLOGY CLINIC | Facility: CLINIC | Age: 51
End: 2021-11-10

## 2021-11-11 ENCOUNTER — HOSPITAL ENCOUNTER (OUTPATIENT)
Dept: RADIOLOGY | Age: 51
Discharge: HOME | End: 2021-11-11
Payer: COMMERCIAL

## 2021-11-11 DIAGNOSIS — N95.0 POSTMENOPAUSAL BLEEDING: ICD-10-CM

## 2021-11-11 PROCEDURE — 76856 US EXAM PELVIC COMPLETE: CPT

## 2021-11-11 PROCEDURE — 76830 TRANSVAGINAL US NON-OB: CPT

## 2021-12-12 ENCOUNTER — HOSPITAL ENCOUNTER (OUTPATIENT)
Facility: HOSPITAL | Age: 51
Setting detail: OBSERVATION
Discharge: LEFT BEFORE DISCHARGE | End: 2021-12-13
Attending: EMERGENCY MEDICINE | Admitting: INTERNAL MEDICINE
Payer: COMMERCIAL

## 2021-12-12 ENCOUNTER — APPOINTMENT (EMERGENCY)
Dept: RADIOLOGY | Facility: HOSPITAL | Age: 51
End: 2021-12-12
Payer: COMMERCIAL

## 2021-12-12 VITALS
OXYGEN SATURATION: 100 % | RESPIRATION RATE: 18 BRPM | HEART RATE: 94 BPM | DIASTOLIC BLOOD PRESSURE: 64 MMHG | SYSTOLIC BLOOD PRESSURE: 126 MMHG | TEMPERATURE: 98.3 F

## 2021-12-12 DIAGNOSIS — R55 SYNCOPE: Primary | ICD-10-CM

## 2021-12-12 DIAGNOSIS — M25.552 ACUTE HIP PAIN, LEFT: ICD-10-CM

## 2021-12-12 DIAGNOSIS — S39.92XA INJURY OF COCCYX, INITIAL ENCOUNTER: ICD-10-CM

## 2021-12-12 DIAGNOSIS — R94.31 PROLONGED Q-T INTERVAL ON ECG: ICD-10-CM

## 2021-12-12 PROBLEM — F32.A DEPRESSION, UNSPECIFIED: Status: ACTIVE | Noted: 2021-12-12

## 2021-12-12 PROBLEM — I42.1 HOCM (HYPERTROPHIC OBSTRUCTIVE CARDIOMYOPATHY) (HCC): Status: ACTIVE | Noted: 2021-12-12

## 2021-12-12 LAB
ANION GAP SERPL CALCULATED.3IONS-SCNC: 3 MMOL/L (ref 4–13)
BASOPHILS # BLD AUTO: 0.07 THOUSANDS/ΜL (ref 0–0.1)
BASOPHILS NFR BLD AUTO: 1 % (ref 0–1)
BUN SERPL-MCNC: 20 MG/DL (ref 5–25)
CALCIUM SERPL-MCNC: 9.9 MG/DL (ref 8.3–10.1)
CHLORIDE SERPL-SCNC: 106 MMOL/L (ref 100–108)
CO2 SERPL-SCNC: 27 MMOL/L (ref 21–32)
CREAT SERPL-MCNC: 0.98 MG/DL (ref 0.6–1.3)
EOSINOPHIL # BLD AUTO: 0.16 THOUSAND/ΜL (ref 0–0.61)
EOSINOPHIL NFR BLD AUTO: 1 % (ref 0–6)
ERYTHROCYTE [DISTWIDTH] IN BLOOD BY AUTOMATED COUNT: 12.1 % (ref 11.6–15.1)
GFR SERPL CREATININE-BSD FRML MDRD: 66 ML/MIN/1.73SQ M
GLUCOSE SERPL-MCNC: 114 MG/DL (ref 65–140)
HCT VFR BLD AUTO: 43.3 % (ref 34.8–46.1)
HGB BLD-MCNC: 13.8 G/DL (ref 11.5–15.4)
IMM GRANULOCYTES # BLD AUTO: 0.05 THOUSAND/UL (ref 0–0.2)
IMM GRANULOCYTES NFR BLD AUTO: 0 % (ref 0–2)
LYMPHOCYTES # BLD AUTO: 2.01 THOUSANDS/ΜL (ref 0.6–4.47)
LYMPHOCYTES NFR BLD AUTO: 18 % (ref 14–44)
MCH RBC QN AUTO: 30.1 PG (ref 26.8–34.3)
MCHC RBC AUTO-ENTMCNC: 31.9 G/DL (ref 31.4–37.4)
MCV RBC AUTO: 94 FL (ref 82–98)
MONOCYTES # BLD AUTO: 0.64 THOUSAND/ΜL (ref 0.17–1.22)
MONOCYTES NFR BLD AUTO: 6 % (ref 4–12)
NEUTROPHILS # BLD AUTO: 8.34 THOUSANDS/ΜL (ref 1.85–7.62)
NEUTS SEG NFR BLD AUTO: 74 % (ref 43–75)
NRBC BLD AUTO-RTO: 0 /100 WBCS
PLATELET # BLD AUTO: 357 THOUSANDS/UL (ref 149–390)
PMV BLD AUTO: 10.1 FL (ref 8.9–12.7)
POTASSIUM SERPL-SCNC: 4.6 MMOL/L (ref 3.5–5.3)
RBC # BLD AUTO: 4.59 MILLION/UL (ref 3.81–5.12)
SODIUM SERPL-SCNC: 136 MMOL/L (ref 136–145)
WBC # BLD AUTO: 11.27 THOUSAND/UL (ref 4.31–10.16)

## 2021-12-12 PROCEDURE — 99285 EMERGENCY DEPT VISIT HI MDM: CPT | Performed by: EMERGENCY MEDICINE

## 2021-12-12 PROCEDURE — 99220 PR INITIAL OBSERVATION CARE/DAY 70 MINUTES: CPT | Performed by: INTERNAL MEDICINE

## 2021-12-12 PROCEDURE — 36415 COLL VENOUS BLD VENIPUNCTURE: CPT

## 2021-12-12 PROCEDURE — 73502 X-RAY EXAM HIP UNI 2-3 VIEWS: CPT

## 2021-12-12 PROCEDURE — 80048 BASIC METABOLIC PNL TOTAL CA: CPT

## 2021-12-12 PROCEDURE — 93005 ELECTROCARDIOGRAM TRACING: CPT

## 2021-12-12 PROCEDURE — 85025 COMPLETE CBC W/AUTO DIFF WBC: CPT

## 2021-12-12 PROCEDURE — 72220 X-RAY EXAM SACRUM TAILBONE: CPT

## 2021-12-12 PROCEDURE — 99285 EMERGENCY DEPT VISIT HI MDM: CPT

## 2021-12-12 RX ORDER — HYDROCODONE BITARTRATE AND ACETAMINOPHEN 5; 325 MG/1; MG/1
1 TABLET ORAL EVERY 6 HOURS PRN
Status: DISCONTINUED | OUTPATIENT
Start: 2021-12-12 | End: 2021-12-13 | Stop reason: HOSPADM

## 2021-12-12 RX ORDER — PANTOPRAZOLE SODIUM 20 MG/1
20 TABLET, DELAYED RELEASE ORAL
Status: DISCONTINUED | OUTPATIENT
Start: 2021-12-13 | End: 2021-12-13 | Stop reason: HOSPADM

## 2021-12-12 RX ORDER — ACETAMINOPHEN 325 MG/1
650 TABLET ORAL EVERY 6 HOURS PRN
Status: DISCONTINUED | OUTPATIENT
Start: 2021-12-12 | End: 2021-12-13 | Stop reason: HOSPADM

## 2021-12-12 RX ORDER — DILTIAZEM HYDROCHLORIDE 180 MG/1
180 CAPSULE, COATED, EXTENDED RELEASE ORAL DAILY
Status: DISCONTINUED | OUTPATIENT
Start: 2021-12-13 | End: 2021-12-13 | Stop reason: HOSPADM

## 2021-12-12 RX ORDER — DOCUSATE SODIUM 100 MG/1
100 CAPSULE, LIQUID FILLED ORAL 2 TIMES DAILY PRN
Status: DISCONTINUED | OUTPATIENT
Start: 2021-12-12 | End: 2021-12-13 | Stop reason: HOSPADM

## 2021-12-12 RX ORDER — MAGNESIUM HYDROXIDE/ALUMINUM HYDROXICE/SIMETHICONE 120; 1200; 1200 MG/30ML; MG/30ML; MG/30ML
30 SUSPENSION ORAL EVERY 6 HOURS PRN
Status: DISCONTINUED | OUTPATIENT
Start: 2021-12-12 | End: 2021-12-13 | Stop reason: HOSPADM

## 2021-12-12 RX ORDER — BUTALBITAL, ACETAMINOPHEN AND CAFFEINE 50; 325; 40 MG/1; MG/1; MG/1
1 TABLET ORAL EVERY 6 HOURS PRN
Status: DISCONTINUED | OUTPATIENT
Start: 2021-12-12 | End: 2021-12-13 | Stop reason: HOSPADM

## 2021-12-12 RX ORDER — KETOROLAC TROMETHAMINE 30 MG/ML
15 INJECTION, SOLUTION INTRAMUSCULAR; INTRAVENOUS ONCE
Status: COMPLETED | OUTPATIENT
Start: 2021-12-12 | End: 2021-12-12

## 2021-12-12 RX ORDER — ONDANSETRON 2 MG/ML
4 INJECTION INTRAMUSCULAR; INTRAVENOUS EVERY 6 HOURS PRN
Status: DISCONTINUED | OUTPATIENT
Start: 2021-12-12 | End: 2021-12-13 | Stop reason: HOSPADM

## 2021-12-12 RX ADMIN — KETOROLAC TROMETHAMINE 15 MG: 30 INJECTION, SOLUTION INTRAMUSCULAR at 20:32

## 2021-12-13 PROCEDURE — 99217 PR OBSERVATION CARE DISCHARGE MANAGEMENT: CPT | Performed by: PHYSICIAN ASSISTANT

## 2021-12-14 LAB
ATRIAL RATE: 78 BPM
P AXIS: 61 DEGREES
PR INTERVAL: 194 MS
QRS AXIS: 72 DEGREES
QRSD INTERVAL: 92 MS
QT INTERVAL: 522 MS
QTC INTERVAL: 595 MS
T WAVE AXIS: 60 DEGREES
VENTRICULAR RATE: 78 BPM

## 2021-12-14 PROCEDURE — 93010 ELECTROCARDIOGRAM REPORT: CPT | Performed by: INTERNAL MEDICINE

## 2022-01-18 ENCOUNTER — OFFICE VISIT (OUTPATIENT)
Dept: CARDIOLOGY CLINIC | Facility: CLINIC | Age: 52
End: 2022-01-18
Payer: COMMERCIAL

## 2022-01-18 VITALS
SYSTOLIC BLOOD PRESSURE: 140 MMHG | HEIGHT: 69 IN | DIASTOLIC BLOOD PRESSURE: 88 MMHG | BODY MASS INDEX: 24.97 KG/M2 | HEART RATE: 82 BPM | WEIGHT: 168.6 LBS

## 2022-01-18 DIAGNOSIS — I45.81 LONG Q-T SYNDROME: ICD-10-CM

## 2022-01-18 DIAGNOSIS — I42.1 HOCM (HYPERTROPHIC OBSTRUCTIVE CARDIOMYOPATHY) (HCC): Primary | ICD-10-CM

## 2022-01-18 PROCEDURE — 99205 OFFICE O/P NEW HI 60 MIN: CPT | Performed by: INTERNAL MEDICINE

## 2022-01-18 NOTE — PROGRESS NOTES
HCM Consultation - Cardiology   Lilia Ast 46 y o  female MRN: [id number]  Unit/Bed#:  Encounter: [id number]    Patient Active Problem List    Diagnosis Date Noted    QT prolongation 12/12/2021    HOCM (hypertrophic obstructive cardiomyopathy) ([facility] [address] ) 12/12/2021    Syncope and collapse 12/12/2021    Depression, unspecified 12/12/2021    Abnormal thyroid function test 10/29/2021    H/O hyperthyroidism 10/29/2021     Plan: Mrs Karen Castillo TTE and cardiac MRI findings are suggestive of hypertrophic cardiomyopathy  Currently she is mostly asymptomatic  She rides horses 3-4 times per week and undergoing exercise training for competitive horse riding, which she would like to continue  We had an extensive discussion about avoiding strenuous exercises in view of recent diagnosis of HCM  So far her workup is not suggestive of high risk HCM  Circumstances of the most recent episode of pre-syncope are suggestive of vaso-vagal etiology  Will proceed with stress echocardiography to evaluate for provoked symptoms, provoked arrhythmia as well as hemodynamic response to exercise  which will complete risk stratification for sudden cardiac death as well as will help guiding recommendations for exercise intensity  She takes diltiazem 180 mg daily as part of migraine management  Will not make changes to medications  During ER visit on 12/13/2021 EKG showed prolonged QTc (595 ms) which was attributed to bupropion and duloxetine  EKG during today's visit with improvement in QTc (426 ms)  Mrs Kimberly Rockwell will be seen Tracy Topete in 3 months  Physician Requesting Consult: Dr Hector Goodpasture  Reason for Consult / Principal Problem: HOCM    HPI: Ms Gabi Webster is a 46year old female with history of hyperthyroidism, vestibular migraines and recent diagnosis of hypertrophic cardiomyopathy was referred to Tracy Topete for further management   Initial cardiac workup was initiated for shortness of breath and chest pain and included EKG which showed biatrial enlargement  That led to TTE in  (in  [place], [place] ) which was significant for mild concentric left ventricular hypertrophy, and severe left atrial enlargement  [facility] and had a repeat TTE on 10/01/2021 which showed normal left ventricular systolic function (LVEF 94% ), reduced global longitudinal strain (-14 2%) with severe regional reduction in the basal -mid anteroseptal wall, no regional wall motion abnormalities, severe asymmetric hypertrophy of septum (2 cm), mildly increased right ventricle, mild systolic anterior motion of anterior leaflet of mitral valve, trace mitral valve and tricuspid valve regurgitations  Cardiac MRI from 10/25/2021 revealed findings consistent with hypertrophic cardiomyopathy, such as increased myocardial wall thickness at mid anteroseptal and inferoseptal walls with subtle hazy delayed mid myocardial enhancement throughout the mid septum,  mild systolic motion of chordae of the anterior mitral valve leaflet, as well as mild left atrial enlargement  Extended ambulatory heart monitoring (12 days 18 hours) from  was mostly benign with 1 run of supraventricular tachycardia (4 beats at 167 beats per minute)  Mrs Melanie Cyr was [place] [facility] [place] 12[place]13/2021 after a mechanical fall from her horse  Later that day patient had presyncopal episode after getting up quickly to use the restroom which preceded by intense pain in the sacral area  She admits to had taken multiple pain medications as well as muscle relaxant prior to the episode  She also admits to dehydration during the day  Describes feeling of diaphoresis and "heat rush" prior to  presyncope  X-ray during ER visit was negative for fracture but MRI done at [facility] on 12/22 showed sacral fracture with bone contusions     Mrs Jayesh Perdomo other medical history includes transient episode of hyperthyroidism for which she was treated with methimazole with later improvement in thyroid functions  as well as diet-controlled hyperlipidemia  Mrs Balbir Faye was physically active prior to sacral fracture with riding horses 3-4 times per week, doing pilates and using [employer] bike  After sacral fracture she worked with physical therapy  Her diet is mainly heart healthy although admit to occasional consumption of milk chocolate  She is able to maintain her weight  We encouraged her to continue with heart-healthy and salt-restricted diet and continue with physical activities as she tolerates      Family history: mother, alive, 71year old, patient is not sure of her cardiac conditions; father, alive 78, no cardiac conditions  She has 2 brothers: 46year old with narcolepsy and 36year old with severe anxiety  Mrs Balbir Faye has no children  Paternal grandmother had hypertension  No history of sudden death or cardiomypathy in close or distant family  Genetic testing: will initiate at this visit    Review of Systems: denies dyspnea, chest pain, palpitations, or lower extremity swelling  Had a syncopal episode 12 years ago that she attributes to "wrong" birth pill  She had pre-syncopal episode in December of 2021, that was precipitated by intense pain due to sacral fracture  Earlier that day she had taken multiple pain medications and muscle relaxant, and did not have adequate hydration  She got up quickly to use restroom and felt "heat rush" with diaphoresis leading to pre-syncope  She has history of dizziness due to vestibular vertigo       Historical Information   Past Medical History:   Diagnosis Date    Migraines      Past Surgical History:   Procedure Laterality Date    UMBILICAL HERNIA REPAIR      1997     Family History   Problem Relation Age of Onset   Jolene Quevedo Breast cancer Mother     Diabetes type II Maternal Grandmother     Alzheimer's disease Paternal Grandmother      Current Outpatient Medications on File Prior to Visit   Medication Sig Dispense Refill    Botox 200 units SOLR       buPROPion (WELLBUTRIN XL) 300 mg 24 hr tablet Take 300 mg by mouth      butalbital-acetaminophen-caffeine (FIORICET,ESGIC) -40 mg per tablet as needed Prn      Cyanocobalamin (VITAMIN B 12 PO) Take by mouth daily (Patient not taking: Reported on 10/29/2021)      diltiazem (CARDIZEM CD) 180 mg 24 hr capsule Take 180 mg by mouth daily       diltiazem (TIAZAC) 240 MG 24 hr capsule Take 180 mg by mouth daily (Patient not taking: Reported on 10/29/2021)      DULoxetine (Cymbalta) 60 mg delayed release capsule Take 60 mg by mouth daily       HYDROcodone-acetaminophen (NORCO) 5-325 mg per tablet       HYDROcodone-acetaminophen (ZOLVIT)  mg/15 mL solution Take 1 tablet by mouth as needed  (Patient not taking: Reported on 10/29/2021)      hydrocortisone 2 5 % lotion Apply 2 5 % topically as needed       Omeprazole 20 MG TBEC as needed        No current facility-administered medications on file prior to visit  Allergies   Allergen Reactions    Augmentin Es-600  [Amoxicillin-Pot Clavulanate] Diarrhea and Dizziness    Banana - Food Allergy      Tummy upset and diarrhea    Ciprofloxacin     Sulfamethoxazole-Trimethoprim      dizzy       Social History     Substance and Sexual Activity   Alcohol Use Not Currently    Comment: socially     Social History     Substance and Sexual Activity   Drug Use Never     Social History     Tobacco Use   Smoking Status Never Smoker   Smokeless Tobacco Never Used     Objective   Vitals:   Vitals:    01/18/22 1153   BP: 140/88   BP Location: Right arm   Patient Position: Sitting   Cuff Size: Large   Pulse: 82   Weight: 76 5 kg (168 lb 9 6 oz)   Height: 5' 9" (1 753 m)   Body surface area is 1 92 meters squared  Body mass index is 24 9 kg/m²      Invasive Devices  Report    None               Physical Exam:  GEN: Kandis Tourefarhaddarcy appears well, alert and oriented x 3, pleasant and cooperative   HEENT: pupils equal, round, and reactive to light; extraocular muscles intact  NECK: supple, no carotid bruits   HEART: regular rhythm, normal S1 and S2, faint systolic murmur at the base, clicks, gallops or rubs with rest or provocative maneuvers  LUNGS: clear to auscultation bilaterally; no wheezes, rales, or rhonchi   ABDOMEN: normal bowel sounds, soft, no tenderness, no distention  EXTREMITIES: peripheral pulses normal; no clubbing, cyanosis, or edema  NEURO: no focal findings   SKIN: normal without suspicious lesions on exposed skin    Lab Results:   Labs:  Lab Results   Component Value Date    WBC 11 27 (H) 2021    RBC 4 59 2021    HGB 13 8 2021    HCT 43 3 2021    MCV 94 2021     2021    RDW 12 1 2021     Lab Results   Component Value Date    K 4 6 2021     2021    CO2 27 2021    BUN 20 2021    CREATININE 0 98 2021    EGFR 66 2021    CALCIUM 9 9 2021    AST 21 2021    ALT 50 2021    ALKPHOS 108 2021     No results found for: MG  No results found for: CHOL, HDL, TRIG, LDLCALC  Lab Results   Component Value Date    [id number] 0 336 (L) 2021    FREET4 0 72 (L) 2021    W0XTCFT 0 80 2021     Imaging:   I have personally reviewed pertinent films in PACS    Cardiac testing:   Results for orders placed during the hospital encounter of 10/01/21    Echo complete with contrast if indicated  Narrative  [address] [place] [address]    Transthoracic Echocardiogram  2D, M-mode, Doppler, and Color Doppler    Study date:  01-Oct-2021    Patient: Martha Christie  MR number: [id number]  Account number: [de-identified]  : 1970  Age: 46 years  Gender: Female  Status: Outpatient  Location: [facility]  Height: 69 in  Weight: 163 lb  BP: 120/ 76 mmHg    Indications: Palpitations    Diagnoses: R00 2 - Palpitations    Sonographer:  ASHLEY Nava  Referring Physician: Alivia Basurto MD  Group:  [facility]  Interpreting Physician:  Megan Najera MD    IMPRESSIONS:  The echocardiographic study was abnormal  Features are consistent with non-obstructive hypertrophic cardiomyopathy with severe asymmetric septal hypertrophy  SUMMARY  LEFT VENTRICLE:  Systolic function was normal  Ejection fraction was estimated to be 65 %  The peak global longitudinal strain was reduced at -14 2%, with severe regional reductions in the basal-mid anteroseptal wall  There were no regional wall motion abnormalities  Wall thickness was markedly increased (IVSd 2 0 cm)  There was severe assymetrical hypertrophy of the septum  RIGHT VENTRICLE:  Wall thickness was mildly increased  MITRAL VALVE:  There was mild systolic anterior motion of the anterior leaflet  There was trace regurgitation  TRICUSPID VALVE:  There was trace regurgitation  HISTORY: PRIOR HISTORY: Palpitations    PROCEDURE: The study was performed in the [facility]  This was a routine study  The transthoracic approach was used  The study included complete 2D imaging, M-mode, complete spectral Doppler, and color Doppler  The heart rate was 83  bpm, at the start of the study  LEFT VENTRICLE: Size was normal  Systolic function was normal  Ejection fraction was estimated to be 65 %  The peak global longitudinal strain was reduced at -14 2%, with severe regional reductions in the basal-mid anteroseptal wall  There  were no regional wall motion abnormalities  Wall thickness was markedly increased (IVSd 2 0 cm)  There was severe assymetrical hypertrophy of the septum  No evidence of apical thrombus  DOPPLER: There was fusion of early and atrial  contributions to ventricular filling  RIGHT VENTRICLE: The size was normal  Systolic function was normal  Wall thickness was mildly increased    LEFT ATRIUM: Size was normal   RIGHT ATRIUM: Size was normal   MITRAL VALVE: Valve structure was normal  There was normal leaflet separation  There was mild systolic anterior motion of the anterior leaflet  DOPPLER: The transmitral velocity was within the normal range  There was no evidence for  stenosis  There was trace regurgitation  AORTIC VALVE: There was no evidence of left ventricular outflow obstruction  The valve was trileaflet  Leaflets exhibited normal thickness and normal cuspal separation  DOPPLER: Transaortic velocity was within the normal range  There was  no evidence for stenosis  There was no significant regurgitation  TRICUSPID VALVE: The valve structure was normal  There was normal leaflet separation  DOPPLER: The transtricuspid velocity was within the normal range  There was no evidence for stenosis  There was trace regurgitation  PULMONIC VALVE: Leaflets exhibited normal thickness, no calcification, and normal cuspal separation  DOPPLER: The transpulmonic velocity was within the normal range  There was no significant regurgitation  PERICARDIUM: There was no pericardial effusion  The pericardium was normal in appearance  AORTA: The root exhibited normal size  SYSTEMIC VEINS: IVC: The inferior vena cava was normal in size      SYSTEM MEASUREMENT TABLES    2D  %FS: 42 17 %  AVC: 346 25 ms  Ao Diam: 3 12 cm  EDV(Teich): 56 63 ml  EF(Teich): 74 05 %  ESV(Teich): 14 7 ml  IVSd: 2 25 cm  LA Diam: 3 62 cm  LAAs A4C: 16 92 cm2  LAESV A-L A4C: 46 49 ml  LAESV MOD A4C: 44 36 ml  LALs A4C: 5 23 cm  LVEDV MOD A4C: 54 1 ml  LVEF MOD A4C: 69 1 %  LVESV MOD A4C: 16 72 ml  LVIDd: 3 66 cm  LVIDs: 2 12 cm  LVLd A4C: 7 68 cm  LVLs A4C: 5 29 cm  LVPWd: 0 96 cm  RAAs A4C: 14 61 cm2  RAESV A-L: 36 79 ml  RAESV MOD: 35 1 ml  RALs: 4 93 cm  RVIDd: 3 24 cm  RWT: 0 52  SV MOD A4C: 37 39 ml  SV(Teich): 41 93 ml    MM  TAPSE: 2 19 cm    [employer] Accredited Echocardiography Laboratory    Prepared and electronically signed by    Erasmo Sal MD  Signed 01-Oct-2021 14:17:05      Ricardo 9-12 to 9-:  Patient had a min HR of 58 bpm, max HR of 167 bpm, and avg HR of 87  bpm  Predominant underlying rhythm was Sinus Rhythm  1 run of  Supraventricular Tachycardia occurred lasting 4 beats with a max rate of  167 bpm (avg 141 bpm)  Isolated SVEs were rare (<1 0%), SVE Couplets  were rare (<1 0%), and no SVE Triplets were present  Isolated VEs were  rare (<1 0%, 65), VE Couplets were rare (<1 0%, 3), and VE Triplets were  rare (<1 0%, 2)  MRI cardiac  w wo contrast  Status: Final result     PACS Images   Show images for MRI cardiac w wo contrast    Study Result  Narrative & Impression   MRI CARDIAC  W WO CONTRAST     Technique:  1  3 plane SSFP localizers  2  SSFP cine imaging in long and short axis planes  3  T2 weighted DIR FSE in short axis plane  4  15 ml gadolinium DTPA power injected  5  2D inversion recovery FGRE for delayed myocardial enhancement    6  The patient tolerated the procedure well without complication      Measurements:   Basal anterior wall: 11 mm  Basal anteroseptal wall: 12 mm  Basal inferoseptal wall: 12 mm  Basal inferior wall: 10 mm  Basal inferolateral wall: 8 mm  Basal anterolateral wall: 11 mm  Mid anterior wall: 13 mm  Mid anteroseptal wall: 20 mm  Mid inferoseptal wall: 19 mm   Mid inferior wall: 12 mm  Mid inferolateral wall: 8 mm  Mid anterolateral wall: 11 mm  Apical anterior wall: 10 mm  Apical septal wall: 8 mm  Apical inferior wall: 10 mm  Apical lateral wall: 6 mm     Global LV Assessment  ------------------------------------------------------------------------------------------------------------------------                                 Value                                                                                      ------------------------------------------------------------------------------------------------------------------------  LVEDV                          90 ml (normal) [82 - 174]                                                                                 LVESV                          39 ml (normal)                                                                                                            [16 - 64]                                                                                  LVSV                           51 ml (reduced)                                                                                                           [57 - 121]                                                                                 LVEF                           57 % (reduced)                                                                                                            [57 - 81]                                                                                  LVCO                           4 5 l/min                                                                                                                                                                                                            LVCI                           2 3 l/min/m^2  (reduced)                                                                                                    [>= 2 50]                                                                                  LV Mass                        144 g (increased)                                                                                                         [54 - 130]                                                                                 Heart rate                     89                                                                                         Global Peak Wall Thickness     23 6 mm                                                                                    Method                         [id number] Graciela Layton       Global RV Assessment  ------------------------------------------------------------------------------------------------------------------------                                 Value                                                                                      ------------------------------------------------------------------------------------------------------------------------  RVEDV                          95 ml (normal)                                                                                                            [66 - 214]                                                                                 RVESV                          35 ml (normal)                                                                                                            [8 - 96]                                                                                   RVSV                           60 ml (normal)                                                                                                            [59 - 127]                                                                                 RVEF                           63 % (normal)                                                                                                             [50 - 78]                                                                                  RVCO                           5 4 l/min                                                                                                                                                                                                            RVCI                           2 8 l/min/m^2 Heart rate                     89        Left atrium 23 sq cm  Aortic Root 24 mm     Findings:    1  Normal left ventricle end-diastolic volume  Normal left ventricle systolic function ejection fraction measuring 57%  Increased myocardial wall thickness at the mid anteroseptal and inferoseptal walls  2  Normal right ventricle end-diastolic volume  Normal right ventricle systolic function ejection fraction measuring 63%  3  The aortic, mitral, and tricuspid valves open without restriction  There is no significant valvular regurgitation, however cine MRI is inaccurate in the qualitative assessment of valvular regurgitation  There is mild systolic motion of the chordae of   the anterior mitral valve leaflet  4  The left atrium is mildly enlarged  The aortic root is normal in size  5  There is no evidence of myocardial edema  6  Delayed post-gadolinium imaging demonstrates subtle hazy delayed enhancement at the mid septum       IMPRESSION:  Impression:  1  Normal left ventricle size and function  Ejection fraction measures 57%  Increased myocardial wall thickness at the mid anteroseptal and inferoseptal walls with subtle hazy delayed mid myocardial enhancement throughout the mid septum  Findings are   consistent with hypertrophic cardiomyopathy  2  Normal right ventricle size and function  Ejection fraction measures 63%  3  Mild systolic motion of the chordae of the anterior mitral valve leaflet  4  Mild left atrial enlargement     Workstation performed: [id number]       [id number]  Normal sinus rhythm, heart rate 75 bpm, left atrial enlargement, nonspecific T-wave abnormalities, QTc 426 ms    Counseling / Coordination of Care  Total floor / unit time spent today 60 minutes  Greater than 50% of total time was spent with the patient and / or family counseling and / or coordination of care  Brando Rodarte

## 2022-01-18 NOTE — PATIENT INSTRUCTIONS
Stress echocardiography is scheduled for February 14th at 10 am at [facility] in [place], [address] [place] [address]  Please take Entrance A and proceed to 1st floor, Cardiology Department  Please call [phone] if need help with directions

## 2022-01-19 ENCOUNTER — DOCUMENTATION (OUTPATIENT)
Dept: CARDIOLOGY CLINIC | Facility: CLINIC | Age: 52
End: 2022-01-19

## 2022-01-19 PROCEDURE — 93000 ELECTROCARDIOGRAM COMPLETE: CPT | Performed by: INTERNAL MEDICINE

## 2022-02-14 ENCOUNTER — HOSPITAL ENCOUNTER (OUTPATIENT)
Dept: NON INVASIVE DIAGNOSTICS | Facility: HOSPITAL | Age: 52
Discharge: HOME | End: 2022-02-14

## 2022-02-14 DIAGNOSIS — I42.1 HOCM (HYPERTROPHIC OBSTRUCTIVE CARDIOMYOPATHY) (HCC): ICD-10-CM

## 2022-02-21 ENCOUNTER — DOCUMENTATION (OUTPATIENT)
Dept: CARDIOLOGY CLINIC | Facility: CLINIC | Age: 52
End: 2022-02-21

## 2022-02-28 ENCOUNTER — HOSPITAL ENCOUNTER (OUTPATIENT)
Dept: NON INVASIVE DIAGNOSTICS | Facility: HOSPITAL | Age: 52
Discharge: HOME | End: 2022-02-28
Payer: COMMERCIAL

## 2022-02-28 VITALS
DIASTOLIC BLOOD PRESSURE: 90 MMHG | WEIGHT: 168 LBS | HEART RATE: 95 BPM | HEIGHT: 69 IN | SYSTOLIC BLOOD PRESSURE: 138 MMHG | BODY MASS INDEX: 24.88 KG/M2

## 2022-02-28 LAB
GLOBAL LONGITUIDAL STRAIN: -12 %
MAX HR PERCENT: 77 %
MAX HR: 142 BPM
RATE PRESSURE PRODUCT: NORMAL
SL CV ECHO LV DYNAMIC OBSTRUCTION PEAK GRADIENT (REST): 15 MMHG
SL CV ECHO LV DYNAMIC OBSTRUCTION PEAK GRADIENT (VALSAL: 30 MMHG
SL CV LV EF: 65
SL CV STRESS RECOVERY BP: NORMAL MMHG
SL CV STRESS RECOVERY HR: 86 BPM
SL CV STRESS RECOVERY O2 SAT: 96 %
SL CV STRESS STAGE REACHED: 5
STRESS ANGINA INDEX: 0
STRESS BASELINE BP: NORMAL MMHG
STRESS BASELINE HR: 95 BPM
STRESS DUKE TREADMILL SCORE: 9
STRESS O2 SAT REST: 97 %
STRESS PEAK HR: 130 BPM
STRESS PERCENT HR: 77 %
STRESS POST ESTIMATED WORKLOAD: 6.3 METS
STRESS POST EXERCISE DUR MIN: 8 MIN
STRESS POST EXERCISE DUR SEC: 43 SEC
STRESS POST O2 SAT PEAK: 98 %
STRESS POST PEAK BP: 202 MMHG
STRESS ST DEPRESSION: 0 MM
STRESS TARGET HR: 130 BPM

## 2022-02-28 PROCEDURE — 93350 STRESS TTE ONLY: CPT

## 2022-02-28 PROCEDURE — 93356 MYOCRD STRAIN IMG SPCKL TRCK: CPT | Performed by: INTERNAL MEDICINE

## 2022-02-28 PROCEDURE — 93356 MYOCRD STRAIN IMG SPCKL TRCK: CPT

## 2022-02-28 PROCEDURE — 93351 STRESS TTE COMPLETE: CPT | Performed by: INTERNAL MEDICINE

## 2022-03-04 LAB
MAX DIASTOLIC BP: 106 MMHG
MAX HEART RATE: 130 BPM
MAX PREDICTED HEART RATE: 167 BPM
MAX. SYSTOLIC BP: 202 MMHG
PROTOCOL NAME: NORMAL
TARGET HR FORMULA: NORMAL
TEST INDICATION: NORMAL
TIME IN EXERCISE PHASE: NORMAL

## 2022-03-11 DIAGNOSIS — I42.1 HOCM (HYPERTROPHIC OBSTRUCTIVE CARDIOMYOPATHY) (HCC): Primary | ICD-10-CM

## 2022-03-11 RX ORDER — DILTIAZEM HYDROCHLORIDE 180 MG/1
180 CAPSULE, COATED, EXTENDED RELEASE ORAL DAILY
Qty: 90 CAPSULE | Refills: 1 | Status: SHIPPED | OUTPATIENT
Start: 2022-03-11 | End: 2022-06-09

## 2022-06-21 ENCOUNTER — TELEPHONE (OUTPATIENT)
Dept: CARDIOLOGY CLINIC | Facility: CLINIC | Age: 52
End: 2022-06-21

## 2022-07-25 ENCOUNTER — TELEPHONE (OUTPATIENT)
Dept: CARDIOLOGY CLINIC | Facility: CLINIC | Age: 52
End: 2022-07-25

## 2022-11-03 DIAGNOSIS — I42.1 HOCM (HYPERTROPHIC OBSTRUCTIVE CARDIOMYOPATHY) (HCC): ICD-10-CM

## 2022-11-04 DIAGNOSIS — I42.1 HOCM (HYPERTROPHIC OBSTRUCTIVE CARDIOMYOPATHY) (HCC): ICD-10-CM

## 2022-11-04 RX ORDER — DILTIAZEM HYDROCHLORIDE 180 MG/1
180 CAPSULE, COATED, EXTENDED RELEASE ORAL DAILY
Qty: 90 CAPSULE | Refills: 1 | Status: SHIPPED | OUTPATIENT
Start: 2022-11-04 | End: 2022-11-11 | Stop reason: SDUPTHER

## 2022-11-11 RX ORDER — DILTIAZEM HYDROCHLORIDE 180 MG/1
CAPSULE, COATED, EXTENDED RELEASE ORAL
Qty: 30 CAPSULE | Refills: 5 | Status: SHIPPED | OUTPATIENT
Start: 2022-11-11

## 2022-11-28 ENCOUNTER — TELEPHONE (OUTPATIENT)
Dept: CARDIOLOGY CLINIC | Facility: CLINIC | Age: 52
End: 2022-11-28

## 2022-12-05 NOTE — PROGRESS NOTES
Consultation - Cardiology   Fara Beasley 45 y o  female MRN: [id number]  Unit/Bed#:  Encounter: [id number]  Patient Active Problem List    Diagnosis Date Noted   • QT prolongation 12/12/2021   • HOCM (hypertrophic obstructive cardiomyopathy) ([address] ) 12/12/2021   • Syncope and collapse 12/12/2021   • Depression, unspecified 12/12/2021   • Abnormal thyroid function test 10/29/2021   • H/O hyperthyroidism 10/29/2021     Plan: Ms Andrew Sutton was seen and evaluated with Dr Yaya Bajwa  Since last HCM clinic visit she had the following tests done:    2- HAL ([employer]):  •  Stress ECG: No ST deviation is noted  The ECG was not diagnostic due to submaximal stress  •  Left Ventricle: Left ventricular cavity size is normal  Wall thickness is severely increased  The left ventricular ejection fraction is 65%  Systolic function is normal   Left ventricular global longitudinal strain is reduced at -12%   Wall motion is normal  There is severe asymmetric hypertrophy of the septal wall  Diastolic function is abnormal  There is outflow tract dynamic obstruction at rest  There is outflow tract dynamic obstruction with valsalva  •  Mitral Valve: There is systolic anterior motion of the posterior leaflet and anterior leaflet  •  Post Stress Echo: Left ventricle cavity has normal reduction in size post-stress  The left ventricle systolic function is hyperdynamic post-stress  The post-stress echo showed normal wall motion  •  Stress ECG: Overall, the patient's exercise capacity was mildly impaired for their age  The patient reached stage 5 0 of the protocol after exercising for 8 min and 43 sec and had a maximal HR of 130 bpm (77 % of MPHR) and 6 3 METS  The patient experienced no angina during the test  The patient achieved their maximal exercise threshold  The patient reported dyspnea and fatigue during the stress test  Symptoms began during stress and ended during recovery    •  Echo Post Impression: Wall motion is normal after submaximal stress  A gradient of approximately 40 mm Hg was noted at peak stress  Target heart rate was not reached  Study was notable for a gradient of approximately 40 mm Hg at peak stress but did not achieve target heart rate  Patient had a gradient of approximately 30 mm Hg with Valsalva at rest  Patient did not achieve target heart rate - but no wall motion abnormalities were noted at peak stress  Findings are consistent with hypertrophic non-obstructive cardiomyopathy  No evidence of arrhythmia noted during study           Today, she reports being mostly asymptomatic from cardiac standpoint  She continues travelling for business purposes  She continues riding horses multiple times per week but avoids intense interval training  Ms Andrew Sutton still gets dizziness episodes related to vestibular migraine  She originaly was prescribed metoprolol succinate 25 mg daily but she takes 12 5 mg daily due to side effect of fatigue  Her blood pressure is controlled on the current regimen  She has no resting or provoked murmur on exam  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 and she notified one of her brothers about the results but she is not sure whether he got tested  We will proceed with annual TTE in 2 -2023 and she will be seen in [facility] clinic in 13 month  Physician Requesting Consult: Dr Emanuel Lomax  Reason for Consult / Principal Problem: HOCM     HPI: Ms Judy Ford is a 45year old female with history of hyperthyroidism, vestibular migraines and recent diagnosis of hypertrophic cardiomyopathy was referred to Tracy Topete for further management  Initial cardiac workup was initiated for shortness of breath and chest pain and included EKG which showed biatrial enlargement  That led to TTE in  (in  [place], [place] ) which was significant for mild concentric left ventricular hypertrophy, and severe left atrial enlargement    She followed with [facility] and had a repeat TTE on 10/01/2021 which showed normal left ventricular systolic function (LVEF 00% ), reduced global longitudinal strain (-14 2%) with severe regional reduction in the basal -mid anteroseptal wall, no regional wall motion abnormalities, severe asymmetric hypertrophy of septum (2 cm), mildly increased right ventricle, mild systolic anterior motion of anterior leaflet of mitral valve, trace mitral valve and tricuspid valve regurgitations  Cardiac MRI from 10/25/2021 revealed findings consistent with hypertrophic cardiomyopathy, such as increased myocardial wall thickness at mid anteroseptal and inferoseptal walls with subtle hazy delayed mid myocardial enhancement throughout the mid septum,  mild systolic motion of chordae of the anterior mitral valve leaflet, as well as mild left atrial enlargement  Extended ambulatory heart monitoring (12 days 18 hours) from  was mostly benign with 1 run of supraventricular tachycardia (4 beats at 167 beats per minute)  Mrs Maud Carrel was seen in [place] [facility] on 12/13/2021 after a mechanical fall from her horse  Later that day patient had presyncopal episode after getting up quickly to use the restroom which preceded by intense pain in the sacral area  She admits to had taken multiple pain medications as well as muscle relaxant prior to the episode  She also admits to dehydration during the day  Describes feeling of diaphoresis and "heat rush" prior to  presyncope  X-ray during ER visit was negative for fracture but MRI done at [facility] on 12/22 showed sacral fracture with bone contusions  Mrs Oneida Cardoso other medical history includes transient episode of hyperthyroidism for which she was treated with methimazole with later improvement in thyroid functions  as well as diet-controlled hyperlipidemia      Mrs Maud Carrel was physically active prior to sacral fracture with riding horses 3-4 times per week, doing pilates and using [employer] bike  After sacral fracture she worked with physical therapy  Her diet is mainly heart healthy although admit to occasional consumption of milk chocolate  She is able to maintain her weight  We encouraged her to continue with heart-healthy and salt-restricted diet and continue with physical activities as she tolerates      1-: Mrs Fatemeh Ferguson TTE and cardiac MRI findings are suggestive of hypertrophic cardiomyopathy  Currently she is mostly asymptomatic  She rides horses 3-4 times per week and undergoing exercise training for competitive horse riding, which she would like to continue  We had an extensive discussion about avoiding strenuous exercises in view of recent diagnosis of HCM  So far her workup is not suggestive of high risk HCM  Circumstances of the most recent episode of pre-syncope are suggestive of vaso-vagal etiology  Will proceed with stress echocardiography to evaluate for provokable symptoms, provokable arrhythmia as well as hemodynamic response to exercise  which will complete risk stratification for sudden cardiac death as well as will help guiding recommendations for exercise intensity  She takes diltiazem 180 mg daily as part of migraine management  Will not make changes to medications  During ER visit on 12/13/2021 EKG showed prolonged QTc (595 ms) which was attributed to bupropion and duloxetine  EKG during today's visit with improvement in QTc (426 ms)  Mrs Rudy Mock will be seen HCM Clinic in 3 months      Family history: mother, alive, 71year old, patient is not sure of her cardiac conditions; father, alive 78, no cardiac conditions  She has 2 brothers: 46year old with narcolepsy and 36year old with severe anxiety  Mrs Rudy Mock has no children  Paternal grandmother had hypertension   No history of sudden death or cardiomypathy in close or distant family       Genetic testing Eli Archibald 2-):          Review of Systems: Denies dyspnea, chest pain, palpitations, lower extremity swelling, or no syncopal episodes since last HCM clinic visit  She has history of dizziness due to vestibular vertigo       Historical Information   Past Medical History:   Diagnosis Date   • Migraines      Past Surgical History:   Procedure Laterality Date   • UMBILICAL HERNIA REPAIR      1997     Social History     Substance and Sexual Activity   Alcohol Use Not Currently    Comment: socially     Social History     Substance and Sexual Activity   Drug Use Never     Social History     Tobacco Use   Smoking Status Never   Smokeless Tobacco Never     Meds/Allergies   all current active meds have been reviewed  Allergies   Allergen Reactions   • Augmentin Es-600  [Amoxicillin-Pot Clavulanate] Diarrhea and Dizziness   • Banana - Food Allergy      Tummy upset and diarrhea   • Ciprofloxacin    • Sulfamethoxazole-Trimethoprim      dizzy       Current Outpatient Medications on File Prior to Visit   Medication Sig Dispense Refill   • Botox 200 units SOLR      • buPROPion (WELLBUTRIN XL) 300 mg 24 hr tablet Take 300 mg by mouth     • butalbital-acetaminophen-caffeine (FIORICET,ESGIC) -40 mg per tablet as needed Prn     • Cyanocobalamin (VITAMIN B 12 PO) Take by mouth daily (Patient not taking: Reported on 10/29/2021)     • diltiazem (CARDIZEM CD) 180 mg 24 hr capsule TAKE 1 CAPSULE BY MOUTH EVERY DAY 30 capsule 5   • DULoxetine (Cymbalta) 60 mg delayed release capsule Take 60 mg by mouth daily      • HYDROcodone-acetaminophen (NORCO) 5-325 mg per tablet      • HYDROcodone-acetaminophen (ZOLVIT)  mg/15 mL solution Take 1 tablet by mouth as needed  (Patient not taking: Reported on 10/29/2021)     • hydrocortisone 2 5 % lotion Apply 2 5 % topically as needed      • metoprolol succinate (TOPROL-XL) 25 mg 24 hr tablet Take 1 tablet (25 mg total) by mouth daily 90 tablet 1   • Omeprazole 20 MG TBEC as needed        No current facility-administered medications on file prior to visit  Objective   Vitals:    22 0914   BP: 123/60   BP Location: Left arm   Patient Position: Sitting   Cuff Size: Standard   Pulse: 75   SpO2: 96%   Weight: 77 1 kg (170 lb)   Height: 5' 9" (1 753 m)   Body surface area is 1 93 meters squared  Body mass index is 25 1 kg/m²  Invasive Devices     None               Physical Exam:  GEN: Kandis Martin appears well, alert and oriented x 3, pleasant and cooperative   HEENT: pupils equal, round, and reactive to light; extraocular muscles intact  NECK: supple, no carotid bruits   HEART: regular rhythm, normal S1 and S2, no murmurs, clicks, gallops or rubs   LUNGS: clear to auscultation bilaterally; no wheezes, rales, or rhonchi   ABDOMEN: normal bowel sounds, soft, no tenderness, no distention  EXTREMITIES: peripheral pulses normal; no clubbing, cyanosis, or edema  NEURO: no focal findings   SKIN: normal without suspicious lesions on exposed skin    Lab Results:   No visits with results within 1 Day(s) from this visit  Latest known visit with results is:   Hospital Outpatient Visit on 2022   Component Date Value   • Protocol Name 2022 WHO    • Time In Exercise Phase 2022 00:08:43    • MAX  SYSTOLIC BP  276    • Max Diastolic Bp  617    • Max Heart Rate 2022 130    • Max Predicted Heart Rate 2022 167    • Test Indication 2022 Screening for CAD    • Target Hr Formular 2022 (220 - Age)*100%        Imaging: I have personally reviewed pertinent reports  Name: Deniz Tovar                       : 1970  MRN: [id number]                       Age: 45 y o  Patient Status: Outpatient          Gender: female  Height & Weight  Height Weight BSA (Calculated - m2)   5' 9" (1 753 m) 76 2 kg (168 lb) 1 92 sq meters   PACS Images   Show images for Echo stress test, exercise  Study Details  The apical and parasternal views were obtained   Strain was performed to quantify interventricular dyssynchrony and evaluate components of myocardial function due to hypertrophic obstructive cardiomyopathy  Results from the utilization of Strain Analysis are listed in the report below  IndicationsPriority: Routine  Cardiomyopathy - hypertrophic cardiomyopathy   Dx: HOCM (hypertrophic obstructive cardiomyopathy) ([facility] [address] ) [I42 1 (ICD-10-CM)]   History  Hcm, abnormal ecg, hx pre-syncopal event, hyperthyroid, ef 65%   Interpretation Summary   •  Stress ECG: No ST deviation is noted  The ECG was not diagnostic due to submaximal stress  •  Left Ventricle: Left ventricular cavity size is normal  Wall thickness is severely increased  The left ventricular ejection fraction is 65%  Systolic function is normal   Left ventricular global longitudinal strain is reduced at -12%   Wall motion is normal  There is severe asymmetric hypertrophy of the septal wall  Diastolic function is abnormal  There is outflow tract dynamic obstruction at rest  There is outflow tract dynamic obstruction with valsalva  •  Mitral Valve: There is systolic anterior motion of the posterior leaflet and anterior leaflet  •  Post Stress Echo: Left ventricle cavity has normal reduction in size post-stress  The left ventricle systolic function is hyperdynamic post-stress  The post-stress echo showed normal wall motion  •  Stress ECG: Overall, the patient's exercise capacity was mildly impaired for their age  The patient reached stage 5 0 of the protocol after exercising for 8 min and 43 sec and had a maximal HR of 130 bpm (77 % of MPHR) and 6 3 METS  The patient experienced no angina during the test  The patient achieved their maximal exercise threshold  The patient reported dyspnea and fatigue during the stress test  Symptoms began during stress and ended during recovery  •  Echo Post Impression: Wall motion is normal after submaximal stress  A gradient of approximately 40 mm Hg was noted at peak stress   Target heart rate was not reached      Study was notable for a gradient of approximately 40 mm Hg at peak stress but did not achieve target heart rate  Patient had a gradient of approximately 30 mm Hg with Valsalva at rest  Patient did not achieve target heart rate - but no wall motion abnormalities were noted at peak stress  Findings are consistent with hypertrophic non-obstructive cardiomyopathy  No evidence of arrhythmia noted during study            Stress Findings  Stress Findings A bicycle protocol stress test was performed  Overall, the patient's exercise capacity was mildly impaired for their age  The patient reached stage 5 0 of the protocol after exercising for 8 min and 43 sec and had a maximal HR of 130 bpm (77 % of MPHR) and 6 3 METS  The patient experienced no angina during the test  The patient achieved their maximal exercise threshold  The patient reported dyspnea and fatigue during the stress test  Symptoms began during stress and ended during recovery  Blood pressure demonstrated a normal response and heart rate demonstrated a blunted response to stress  Findings  Left Ventricle Left ventricular cavity size is normal  Wall thickness is severely increased  The left ventricular ejection fraction is 65%  Systolic function is normal   Left ventricular global longitudinal strain is reduced at -12%   Wall motion is normal  There is severe asymmetric hypertrophy of the septal wall  Diastolic function is abnormal   There is outflow tract dynamic obstruction at rest  There is outflow tract dynamic obstruction with valsalva  Right Ventricle Right ventricular cavity size is normal  Systolic function is normal    Left Atrium The atrium is normal in size  Aortic Valve The leaflets are not thickened  The leaflets are not calcified  There is trace regurgitation  There is no evidence of stenosis  Mitral Valve There is trace regurgitation  There is no evidence of stenosis   There is systolic anterior motion of the posterior leaflet and anterior leaflet  Tricuspid Valve Tricuspid valve structure is normal  There is no evidence of regurgitation  Pulmonic Valve The pulmonic valve was not well visualized  Pericardium There is no pericardial effusion  The pericardium is normal in appearance  Stress Echo Findings  Low Dose Left ventricle cavity has normal reduction in size during low dose stress  The left ventricle systolic function is hyperdynamic during low dose stress  Peak Stress The left ventricle systolic function is hyperdynamic at peak stress  Peak LVOT gradient is 40 mm Hg at peak stress  The peak stress echo showed normal wall motion which was hyperdynamic compared to baseline  Study Impression Wall motion is normal after submaximal stress  A gradient of approximately 40 mm Hg was noted at peak stress  Target heart rate was not reached     Stress Measurements  Baseline Vitals   Baseline HR 95 bpm         Baseline /90 mmHg         O2 sat rest 97 %         Peak Stress Vitals   Stress peak  bpm         Post peak  mmHg         O2 sat peak 98 %         Max HR Percent 77 %         Max  bpm         Recovery Vitals   Recovery HR 86 bpm         Recovery /82 mmHg         O2 sat recovery 96 %          Exercise Data   Max  bpm         Target  bpm         Percent HR 77 %         Exercise duration (min) 8 min         Exercise duration (sec) 43 sec         Estimated workload 6 3 METS         Stress Stage Reached 5          Angina Index 0          Rate Pressure Product 26,260          Stress ECG   ST Depression (mm) 0 mm            Stage Data 2/28/22  8:00 AM--2/28/22  8:57 AM  Date/Time Stage BP HR O2 RPP Bicycle - [place] Symptoms   02/28/22 0828 Baseline 126/90 85 bpm 97 % 56702 0 none   02/28/22 0832 Stage 1 138/90 95 bpm -- 22584 25 none   02/28/22 0834 Stage 2 140/88 106 bpm 96 % 31069 50 none   02/28/22 0836 Stage 3 144/98 114 bpm 96 % 45002 75 dyspnea   02/28/22 0838 Stage 4 182/100 125 bpm 97 % 86522 100 dyspnea/fatigue   02/28/22 0839 Stage 5 -- 125 bpm 97 % -- 125 dyspnea/fatigue   02/28/22 0840 Immediate/Peak 202/106 130 bpm 98 % 80073 0 fatigue   02/28/22 0842 Recovery 1 162/88 101 bpm 96 % 13308 0 subsiding   02/28/22 0844 Recovery 2 138/82 86 bpm 96 % 37859 0 none   Left Ventricle Measurements  Strain   GLS -12 %          Other Measurements   Peak gradient (Rest) 15 mmHg         Peak Gradient (Valsalva) 30 mmHg            Exam Details  Performed Procedure Technologist Supporting Staff Performing Physician   Echo stress test, exercise w/ strain Verlee Boeck, RN [place], RS   Lissa Antony, Pacheco Gonsales MD         Appointment Date/Status Modality Department    2/28/2022     Completed BE STRESS 1 BE CAR NON INV           Begin Exam End Exam Begin Exam Questionnaires End Exam Questionnaires   2/28/2022  8:00 AM 2/28/2022  8:57 AM CV STRESS QUESTIONNAIRE PATIENT EDUCATION          All Reviewers List  Terrie Thomas DO on 3/2/2022  1:05 PM   Jacob Rawls MD on 3/1/2022 11:14 AM   Signed  Electronically signed by Yeyo Jenkins DO on 2/28/22 at [phone] EST   Electronically signed by Jacob Rawls MD on 2/28/22 at [phone] EST     Counseling / Coordination of Care  Total floor / unit time spent today 40 minutes  Greater than 50% of total time was spent with the patient and / or family counseling and / or coordination of care

## 2022-12-06 ENCOUNTER — OFFICE VISIT (OUTPATIENT)
Dept: CARDIAC SURGERY | Facility: CLINIC | Age: 52
End: 2022-12-06

## 2022-12-06 VITALS
SYSTOLIC BLOOD PRESSURE: 123 MMHG | HEART RATE: 75 BPM | BODY MASS INDEX: 25.18 KG/M2 | HEIGHT: 69 IN | OXYGEN SATURATION: 96 % | WEIGHT: 170 LBS | DIASTOLIC BLOOD PRESSURE: 60 MMHG

## 2022-12-06 DIAGNOSIS — R55 SYNCOPE AND COLLAPSE: ICD-10-CM

## 2022-12-06 DIAGNOSIS — R94.31 QT PROLONGATION: ICD-10-CM

## 2022-12-06 DIAGNOSIS — I42.1 HOCM (HYPERTROPHIC OBSTRUCTIVE CARDIOMYOPATHY) (HCC): Primary | ICD-10-CM

## 2022-12-06 RX ORDER — IBUPROFEN AND PSEUDOEPHEDRINE HYDROCHLORIDE 200; 30 MG/1; MG/1
TABLET, COATED ORAL
COMMUNITY

## 2023-01-16 PROBLEM — M47.816 FACET ARTHROPATHY, LUMBAR: Status: ACTIVE | Noted: 2022-02-01

## 2023-02-01 ENCOUNTER — OFFICE VISIT (OUTPATIENT)
Dept: URGENT CARE | Age: 53
End: 2023-02-01

## 2023-02-01 VITALS
SYSTOLIC BLOOD PRESSURE: 104 MMHG | DIASTOLIC BLOOD PRESSURE: 53 MMHG | RESPIRATION RATE: 18 BRPM | TEMPERATURE: 97.2 F | HEART RATE: 75 BPM

## 2023-02-01 DIAGNOSIS — S09.90XA INJURY OF HEAD, INITIAL ENCOUNTER: Primary | ICD-10-CM

## 2023-02-01 NOTE — PATIENT INSTRUCTIONS
Neurological exam within normal limits, symptoms suggest a possibility for mild concussion at this time  Will defer imaging as there is no neck pain, signs of serious head injury  Continue to alternate Tylenol and ibuprofen as needed for pain  If you notice lethargy, worsening headache or dizziness, vomiting, neck pain or stiffness, report to emergency department immediately for further evaluation

## 2023-02-01 NOTE — PROGRESS NOTES
[address] [employer]        NAME: Lucian Aguilar is a 45 y o  female  : 1970    MRN: [id number]  DATE: 2023  TIME: 1:53 PM    Assessment and Plan   Injury of head, initial encounter [S09 90XA]  1  Injury of head, initial encounter        Neurological exam within normal limits, symptoms suggest a possibility for mild concussion at this time  Will defer imaging as there is no neck pain, signs of serious head injury, spinous process tenderness  Continue to alternate Tylenol and ibuprofen as needed for pain  If you notice lethargy, worsening headache or dizziness, vomiting, neck pain or stiffness, report to emergency department immediately for further evaluation  Patient Instructions   Concussion   WHAT YOU NEED TO KNOW:   A concussion is a mild brain injury  It is usually caused by a bump or blow to the head from a fall, a motor vehicle crash, or a sports injury  Sometimes being shaken forcefully may cause a concussion  DISCHARGE INSTRUCTIONS:   Have someone call 911 for any of the following:   • Someone tries to wake you and cannot do so      • You have a seizure, increasing confusion, or a change in personality      • Your speech becomes slurred, or you have new vision problems      Return to the emergency department if:   • You have sudden and new vision problems      • You have a severe headache that does not go away      • You have arm or leg weakness, numbness, or new problems with coordination      • You have blood or clear fluid coming out of the ears or nose      Contact your healthcare provider if:   • You have nausea or are vomiting      • You feel more sleepy than usual      • Your symptoms get worse      • Your symptoms last longer than 6 weeks after the injury      • You have questions or concerns about your condition or care      Medicines: You may need any of the following:  • Acetaminophen  decreases pain and fever  It is available without a doctor's order   Ask how much to take and how often to take it  Follow directions  Read the labels of all other medicines you are using to see if they also contain acetaminophen, or ask your doctor or pharmacist  Acetaminophen can cause liver damage if not taken correctly  Do not use more than 4 grams (4,000 milligrams) total of acetaminophen in one day       • NSAIDs  help decrease swelling and pain or fever  This medicine is available with or without a doctor's order  NSAIDs can cause stomach bleeding or kidney problems in certain people  If you take blood thinner medicine, always ask your healthcare provider if NSAIDs are safe for you  Always read the medicine label and follow directions      • Take your medicine as directed  Contact your healthcare provider if you think your medicine is not helping or if you have side effects  Tell him or her if you are allergic to any medicine  Keep a list of the medicines, vitamins, and herbs you take  Include the amounts, and when and why you take them  Bring the list or the pill bottles to follow-up visits  Carry your medicine list with you in case of an emergency      Self-care:  Concussion symptoms usually go away within about 10 days, but they may last longer  The following may be recommended to manage your symptoms:  • Rest from physical and mental activities as directed  Mental activities are those that require thinking, concentration, and attention  You will need to rest until your symptoms are gone  Rest will allow you to recover from your concussion  Ask your healthcare provider when you can return to work and other daily activities      • Have someone stay with you for the first 24 hours after your injury  Your healthcare provider should be contacted if your symptoms get worse, or you develop new symptoms      • Do not participate in sports and physical activities until your healthcare provider says it is okay  They could make your symptoms worse or lead to another concussion   Your healthcare provider will tell you when it is okay for you to return to sports or physical activities  Ask for more information about sports concussions      Prevent another concussion:   • Wear protective sports equipment that fits properly  Helmets help decrease your risk for a serious brain injury  Talk to your healthcare provider about ways you can decrease your risk for a concussion if you play sports      • Wear your seatbelt every time you travel  This helps to decrease your risk for a head injury if you are in a car accident      Follow up with your doctor as directed:  Write down your questions so you remember to ask them during your visits  © Copyright [employer] 2022 Information is for End User's use only and may not be sold, redistributed or otherwise used for commercial purposes  All illustrations and images included in CareNotes® are the copyrighted property of [employer] M [employer]  or [employer]   The above information is an  only  It is not intended as medical advice for individual conditions or treatments  Talk to your doctor, nurse or pharmacist before following any medical regimen to see if it is safe and effective for you      Head Injury   WHAT YOU NEED TO KNOW:   A head injury can include your scalp, face, skull, or brain and range from mild to severe  Effects can appear immediately after the injury or develop later  The effects may last a short time or be permanent  Healthcare providers may want to check your recovery over time  Treatment may change as you recover or develop new health problems from the head injury    DISCHARGE INSTRUCTIONS:   Call your local emergency number ([phone] in the [address] [place] [address],[address]), or have someone else call if:   • You cannot be woken      • You have a seizure      • You stop responding to others or you faint      • You have blurry or double vision      • Your speech becomes slurred or confused      • You have arm or leg weakness, loss of feeling, or new problems with coordination      • Your pupils are larger than usual, or one pupil is a different size than the other      • You have blood or clear fluid coming out of your ears or nose      Return to the emergency department if:   • You have repeated or forceful vomiting      • You feel confused      • Your headache gets worse or becomes severe      • You or someone caring for you notices that you are harder to wake than usual      Call your doctor if:   • Your symptoms last longer than 6 weeks after the injury      • You have questions or concerns about your condition or care      Medicines:   • Acetaminophen  decreases pain and fever  It is available without a doctor's order  Ask how much to take and how often to take it  Follow directions  Read the labels of all other medicines you are using to see if they also contain acetaminophen, or ask your doctor or pharmacist  Acetaminophen can cause liver damage if not taken correctly  Do not use more than 4 grams (4,000 milligrams) total of acetaminophen in one day       • Take your medicine as directed  Contact your healthcare provider if you think your medicine is not helping or if you have side effects  Tell him or her if you are allergic to any medicine  Keep a list of the medicines, vitamins, and herbs you take  Include the amounts, and when and why you take them  Bring the list or the pill bottles to follow-up visits  Carry your medicine list with you in case of an emergency      Self-care:   • Rest  or do quiet activities  Limit your time watching TV, using the computer, or doing tasks that require a lot of thinking  Slowly return to your normal activities as directed  Do not play sports or do activities that may cause you to get hit in the head  Ask your healthcare provider when you can return to sports      • Apply ice  on your head for 15 to 20 minutes every hour or as directed  Use an ice pack, or put crushed ice in a plastic bag   Cover it with a towel before you apply it to your skin  Ice helps prevent tissue damage and decreases swelling and pain      • Have someone stay with you for 24 hours  , or as directed  This person can monitor you for problems and call for help if needed  When you are awake, the person should ask you a few questions every few hours to see if you are thinking clearly  An example is to ask your name or address      Prevent another head injury:   • Wear a helmet that fits properly  Do this when you play sports, or ride a bike, scooter, or skateboard  Helmets help decrease your risk for a serious head injury  Talk to your healthcare provider about other ways you can protect yourself if you play sports      • Wear your seatbelt every time you are in a car  This helps lower your risk for a head injury if you are in a car accident      Follow up with your doctor as directed:  Write down your questions so you remember to ask them during your visits  © [employer] 2022 Information is for End User's use only and may not be sold, redistributed or otherwise used for commercial purposes  All illustrations and images included in CareNotes® are the copyrighted property of A D A M , Inc  or [facility] [place]   The above information is an  only  It is not intended as medical advice for individual conditions or treatments  Talk to your doctor, nurse or pharmacist before following any medical regimen to see if it is safe and effective for you  Follow up with PCP in 3-5 days  Proceed to  ER if symptoms worsen  Chief Complaint     Chief Complaint   Patient presents with   • Fall     Patient fell from a horse hitting her head and back  Edgaria Mc  also c/o neck pain         History of Present Illness       Patient is a 57-year-old female with past medical history significant for migraine, concussion, restless leg syndrome, fracture of coccyx and sacrum approximately 1 year ago, who presents for evaluation of left buttocks pain and occipital head pain after falling from her horse this morning  She reports the horse was trotting in a riding arena with a esequiel floor and twisted in a way that caused her to fall backward  She landed on her left buttock and subsequently sustained an occipital impact  She denies LOC, vision changes and dizziness, vomiting, back or abdominal pain, numbness, tingling, saddle anesthesia or change in bowel or bladder function, blood thinner use  She reports headache which she rates as a 5 out of 10 in which  significantly improved after taking Advil and half of a hydrocodone tablet  Patient reports pain with ambulation in her left buttock  Of note, she does see a chiropractor for SI joint dysfunction  Review of Systems   Review of Systems   Constitutional: Negative for fatigue and fever  HENT: Negative for congestion, ear discharge, ear pain, postnasal drip, rhinorrhea, sinus pressure, sinus pain, sneezing and sore throat  Eyes: Negative  Negative for pain, discharge, redness and itching  Respiratory: Negative  Negative for apnea, cough, choking, chest tightness, shortness of breath, wheezing and stridor  Cardiovascular: Negative  Negative for chest pain and palpitations  Gastrointestinal: Negative  Negative for diarrhea, nausea and vomiting  Endocrine: Negative  Negative for polydipsia, polyphagia and polyuria  Genitourinary: Negative  Negative for decreased urine volume and flank pain  Musculoskeletal: Positive for myalgias  Negative for arthralgias, back pain, gait problem, joint swelling, neck pain and neck stiffness  Skin: Negative  Negative for color change and rash  Allergic/Immunologic: Negative  Negative for environmental allergies  Neurological: Positive for headaches  Negative for dizziness, facial asymmetry, light-headedness and numbness  Hematological: Negative  Negative for adenopathy  Psychiatric/Behavioral: Negative            Current Medications Current Outpatient Medications:   •  Botox 200 units SOLR, , Disp: , Rfl:   •  buPROPion (WELLBUTRIN XL) 300 mg 24 hr tablet, Take 300 mg by mouth Takes 375 mg once daily  , Disp: , Rfl:   •  butalbital-acetaminophen-caffeine (FIORICET,ESGIC) -40 mg per tablet, as needed Prn, Disp: , Rfl:   •  diltiazem (CARDIZEM CD) 180 mg 24 hr capsule, TAKE 1 CAPSULE BY MOUTH EVERY DAY, Disp: 30 capsule, Rfl: 5  •  DULoxetine (CYMBALTA) 60 mg delayed release capsule, Take 20 mg by mouth daily, Disp: , Rfl:   •  HYDROcodone-acetaminophen (NORCO) 5-325 mg per tablet, , Disp: , Rfl:   •  hydrocortisone 2 5 % lotion, Apply 2 5 % topically as needed , Disp: , Rfl:   •  Omeprazole 20 MG TBEC, as needed , Disp: , Rfl:   •  Cyanocobalamin (VITAMIN B 12 PO), Take by mouth daily (Patient not taking: Reported on 10/29/2021), Disp: , Rfl:   •  HYDROcodone-acetaminophen (ZOLVIT)  mg/15 mL solution, Take 1 tablet by mouth as needed  (Patient not taking: Reported on 10/29/2021), Disp: , Rfl:   •  metoprolol succinate (TOPROL-XL) 25 mg 24 hr tablet, Take 1 tablet (25 mg total) by mouth daily (Patient taking differently: Take 25 mg by mouth daily Taking 12 5 mg once daily), Disp: 90 tablet, Rfl: 1  •  Pseudoephedrine-Ibuprofen (Advil Cold/Sinus)  MG TABS, Take by mouth, Disp: , Rfl:   •  sodium picosulfate, magnesium oxide, citric acid (Clenpiq) 10-3 5-12 MG-GM -GM/160ML SOLN, Take 1 kit by mouth see administration instructions Follow instructions given at office, Disp: 160 mL, Rfl: 0    Current Allergies     Allergies as of 02/01/2023 - Reviewed 02/01/2023   Allergen Reaction Noted   • Banana - food allergy  07/17/2020   • Ciprofloxacin  08/14/2015   • Sulfamethoxazole-trimethoprim  08/14/2015            The following portions of the patient's history were reviewed and updated as appropriate: allergies, current medications, past family history, past medical history, past social history, past surgical history and problem list      Past Medical History:   Diagnosis Date   • HOCM (hypertrophic obstructive cardiomyopathy) ([facility] [address] )    • Migraines    • QT prolongation        Past Surgical History:   Procedure Laterality Date   • UMBILICAL HERNIA REPAIR      1997       Family History   Problem Relation Age of Onset   • Breast cancer Mother    • No Known Problems Father    • Diabetes type II Maternal Grandmother    • Colon polyps Maternal Grandfather    • Alzheimer's disease Paternal Grandmother          Medications have been verified  Objective   /53   Pulse 75   Temp (!) 97 2 °F (36 2 °C) (Temporal)   Resp (!) 75        Physical Exam     Physical Exam  Vitals and nursing note reviewed  Constitutional:       General: She is not in acute distress  Appearance: Normal appearance  She is not ill-appearing, toxic-appearing or diaphoretic  HENT:      Head: Normocephalic and atraumatic  Right Ear: Tympanic membrane, ear canal and external ear normal  There is no impacted cerumen  Left Ear: Tympanic membrane, ear canal and external ear normal  There is no impacted cerumen  Nose: Nose normal  No congestion or rhinorrhea  Mouth/Throat:      Mouth: Mucous membranes are moist    Eyes:      Extraocular Movements: Extraocular movements intact  Conjunctiva/sclera: Conjunctivae normal       Pupils: Pupils are equal, round, and reactive to light  Neck:      Comments: Some tenderness of bilateral paraspinal muscles of neck  Cardiovascular:      Rate and Rhythm: Normal rate and regular rhythm  Pulses: Normal pulses  Heart sounds: Normal heart sounds  No murmur heard  No friction rub  No gallop  Pulmonary:      Effort: Pulmonary effort is normal  No respiratory distress  Breath sounds: Normal breath sounds  No stridor  No wheezing, rhonchi or rales  Abdominal:      General: Bowel sounds are normal       Palpations: Abdomen is soft  Tenderness: There is no abdominal tenderness   There is no guarding or rebound  Musculoskeletal:         General: Tenderness present  No signs of injury  Normal range of motion  Cervical back: Full passive range of motion without pain, normal range of motion and neck supple  Tenderness present  No swelling, edema, deformity, erythema, signs of trauma, lacerations, rigidity, spasms, torticollis, bony tenderness or crepitus  Muscular tenderness present  No pain with movement or spinous process tenderness  Normal range of motion  Lumbar back: Normal  No swelling, edema, deformity, signs of trauma, lacerations, spasms, tenderness or bony tenderness  Normal range of motion  Negative right straight leg raise test and negative left straight leg raise test  No scoliosis  Left hip: Normal  No deformity, lacerations, tenderness, bony tenderness or crepitus  Normal range of motion  Normal strength  Left upper leg: Normal       Comments: (-) ecchymosis, swelling of left buttock, hip or lumbar spine  Full ROM of lumbar spine with flexion @ 90 degrees  Lymphadenopathy:      Cervical: No cervical adenopathy  Right cervical: No superficial cervical adenopathy  Left cervical: No superficial cervical adenopathy  Skin:     General: Skin is warm and dry  Capillary Refill: Capillary refill takes less than 2 seconds  Neurological:      General: No focal deficit present  Mental Status: She is alert and oriented to person, place, and time  Mental status is at baseline  GCS: GCS eye subscore is 4  GCS verbal subscore is 5  GCS motor subscore is 6  Cranial Nerves: Cranial nerves 2-12 are intact  No cranial nerve deficit  Sensory: Sensation is intact  No sensory deficit  Motor: Motor function is intact  No weakness  Coordination: Coordination is intact  Romberg sign negative   Coordination normal  Finger-Nose-Finger Test normal       Gait: Gait and tandem walk normal       Deep Tendon Reflexes: Reflexes normal  Psychiatric:         Mood and Affect: Mood normal          Behavior: Behavior normal

## 2023-03-15 DIAGNOSIS — I42.1 HOCM (HYPERTROPHIC OBSTRUCTIVE CARDIOMYOPATHY) (HCC): ICD-10-CM

## 2023-03-15 RX ORDER — METOPROLOL SUCCINATE 25 MG/1
12.5 TABLET, EXTENDED RELEASE ORAL DAILY
Qty: 45 TABLET | Refills: 3 | Status: SHIPPED | OUTPATIENT
Start: 2023-03-15 | End: 2024-03-09

## 2023-03-15 RX ORDER — METOPROLOL SUCCINATE 25 MG/1
25 TABLET, EXTENDED RELEASE ORAL DAILY
Qty: 30 TABLET | Refills: 1 | Status: CANCELLED | OUTPATIENT
Start: 2023-03-15

## 2023-04-25 DIAGNOSIS — I42.1 HOCM (HYPERTROPHIC OBSTRUCTIVE CARDIOMYOPATHY) (HCC): ICD-10-CM

## 2023-04-26 RX ORDER — DILTIAZEM HYDROCHLORIDE 180 MG/1
CAPSULE, COATED, EXTENDED RELEASE ORAL
Qty: 30 CAPSULE | Refills: 5 | Status: SHIPPED | OUTPATIENT
Start: 2023-04-26

## 2023-05-23 DIAGNOSIS — I42.1 HOCM (HYPERTROPHIC OBSTRUCTIVE CARDIOMYOPATHY) (HCC): ICD-10-CM

## 2023-05-23 RX ORDER — DILTIAZEM HYDROCHLORIDE 180 MG/1
CAPSULE, COATED, EXTENDED RELEASE ORAL
Qty: 90 CAPSULE | Refills: 2 | Status: SHIPPED | OUTPATIENT
Start: 2023-05-23

## 2023-07-27 RX ORDER — ONDANSETRON 2 MG/ML
4 INJECTION INTRAMUSCULAR; INTRAVENOUS EVERY 6 HOURS PRN
Status: CANCELLED | OUTPATIENT
Start: 2023-07-27

## 2023-07-27 RX ORDER — SODIUM CHLORIDE 9 MG/ML
125 INJECTION, SOLUTION INTRAVENOUS CONTINUOUS
Status: CANCELLED | OUTPATIENT
Start: 2023-07-27

## 2023-07-28 ENCOUNTER — HOSPITAL ENCOUNTER (OUTPATIENT)
Dept: GASTROENTEROLOGY | Facility: HOSPITAL | Age: 53
Setting detail: OUTPATIENT SURGERY
End: 2023-07-28
Attending: INTERNAL MEDICINE
Payer: COMMERCIAL

## 2023-07-28 ENCOUNTER — ANESTHESIA EVENT (OUTPATIENT)
Dept: GASTROENTEROLOGY | Facility: HOSPITAL | Age: 53
End: 2023-07-28

## 2023-07-28 ENCOUNTER — ANESTHESIA (OUTPATIENT)
Dept: GASTROENTEROLOGY | Facility: HOSPITAL | Age: 53
End: 2023-07-28

## 2023-07-28 VITALS
HEART RATE: 64 BPM | DIASTOLIC BLOOD PRESSURE: 58 MMHG | BODY MASS INDEX: 25.33 KG/M2 | SYSTOLIC BLOOD PRESSURE: 98 MMHG | WEIGHT: 171 LBS | RESPIRATION RATE: 15 BRPM | HEIGHT: 69 IN | OXYGEN SATURATION: 100 % | TEMPERATURE: 98.5 F

## 2023-07-28 DIAGNOSIS — Z12.11 SCREENING FOR COLON CANCER: ICD-10-CM

## 2023-07-28 PROCEDURE — G0121 COLON CA SCRN NOT HI RSK IND: HCPCS | Performed by: INTERNAL MEDICINE

## 2023-07-28 RX ORDER — ONDANSETRON 2 MG/ML
4 INJECTION INTRAMUSCULAR; INTRAVENOUS EVERY 6 HOURS PRN
Status: DISCONTINUED | OUTPATIENT
Start: 2023-07-28 | End: 2023-08-01 | Stop reason: HOSPADM

## 2023-07-28 RX ORDER — PROPOFOL 10 MG/ML
INJECTION, EMULSION INTRAVENOUS AS NEEDED
Status: DISCONTINUED | OUTPATIENT
Start: 2023-07-28 | End: 2023-07-28

## 2023-07-28 RX ORDER — PHENYLEPHRINE HCL IN 0.9% NACL 1 MG/10 ML
SYRINGE (ML) INTRAVENOUS AS NEEDED
Status: DISCONTINUED | OUTPATIENT
Start: 2023-07-28 | End: 2023-07-28

## 2023-07-28 RX ORDER — SODIUM CHLORIDE 9 MG/ML
125 INJECTION, SOLUTION INTRAVENOUS CONTINUOUS
Status: DISCONTINUED | OUTPATIENT
Start: 2023-07-28 | End: 2023-08-01 | Stop reason: HOSPADM

## 2023-07-28 RX ADMIN — PROPOFOL 50 MG: 10 INJECTION, EMULSION INTRAVENOUS at 09:08

## 2023-07-28 RX ADMIN — Medication 100 MCG: at 09:05

## 2023-07-28 RX ADMIN — PROPOFOL 50 MG: 10 INJECTION, EMULSION INTRAVENOUS at 09:05

## 2023-07-28 RX ADMIN — Medication 100 MCG: at 09:19

## 2023-07-28 RX ADMIN — PROPOFOL 50 MG: 10 INJECTION, EMULSION INTRAVENOUS at 09:11

## 2023-07-28 RX ADMIN — PROPOFOL 50 MG: 10 INJECTION, EMULSION INTRAVENOUS at 09:03

## 2023-07-28 RX ADMIN — Medication 100 MCG: at 09:11

## 2023-07-28 RX ADMIN — PROPOFOL 50 MG: 10 INJECTION, EMULSION INTRAVENOUS at 09:04

## 2023-07-28 RX ADMIN — SODIUM CHLORIDE 125 ML/HR: 0.9 INJECTION, SOLUTION INTRAVENOUS at 08:07

## 2023-07-28 RX ADMIN — PROPOFOL 50 MG: 10 INJECTION, EMULSION INTRAVENOUS at 09:14

## 2023-07-28 RX ADMIN — PROPOFOL 50 MG: 10 INJECTION, EMULSION INTRAVENOUS at 09:07

## 2023-07-28 RX ADMIN — Medication 100 MCG: at 09:02

## 2023-07-28 RX ADMIN — PROPOFOL 100 MG: 10 INJECTION, EMULSION INTRAVENOUS at 09:01

## 2023-07-28 RX ADMIN — SODIUM CHLORIDE: 0.9 INJECTION, SOLUTION INTRAVENOUS at 08:52

## 2023-07-28 RX ADMIN — PROPOFOL 50 MG: 10 INJECTION, EMULSION INTRAVENOUS at 09:09

## 2023-07-28 NOTE — ANESTHESIA POSTPROCEDURE EVALUATION
Post-Op Assessment Note    CV Status:  Stable    Pain management: adequate     Mental Status:  Alert and awake   Hydration Status:  Euvolemic   PONV Controlled:  Controlled   Airway Patency:  Patent      Post Op Vitals Reviewed: Yes      Staff: Anesthesiologist         No notable events documented.     BP      Temp      Pulse     Resp      SpO2      BP 98/58   Pulse 64   Temp 98.5 °F (36.9 °C) (Temporal)   Resp 15   Ht 5' 9" (1.753 m)   Wt 77.6 kg (171 lb)   SpO2 100%   BMI 25.25 kg/m²

## 2023-07-28 NOTE — ANESTHESIA PREPROCEDURE EVALUATION
Procedure:  COLONOSCOPY    Relevant Problems   NEURO/PSYCH   (+) Depression, unspecified      Cardiovascular and Mediastinum   (+) HOCM (hypertrophic obstructive cardiomyopathy) (HCC)      Other   (+) QT prolongation   (+) Syncope and collapse        Physical Exam    Airway    Mallampati score: II  TM Distance: >3 FB  Neck ROM: full     Dental   No notable dental hx     Cardiovascular  Rhythm: regular, Rate: normal, Cardiovascular exam normal    Pulmonary  Pulmonary exam normal Breath sounds clear to auscultation,     Other Findings        Anesthesia Plan  ASA Score- 3     Anesthesia Type- general with ASA Monitors. Additional Monitors:   Airway Plan:     Comment: HOCM. Will hydrate with IVF pre op. Maintain afterload. .       Plan Factors-    Chart reviewed. EKG reviewed. Imaging results reviewed. Patient summary reviewed. Induction-     Postoperative Plan-     Informed Consent- Anesthetic plan and risks discussed with patient. I personally reviewed this patient with the CRNA. Discussed and agreed on the Anesthesia Plan with the CRNA. Clifford Lawson

## 2023-11-20 DIAGNOSIS — I42.1 HOCM (HYPERTROPHIC OBSTRUCTIVE CARDIOMYOPATHY) (HCC): Primary | ICD-10-CM

## 2024-02-06 DIAGNOSIS — I42.1 HOCM (HYPERTROPHIC OBSTRUCTIVE CARDIOMYOPATHY) (HCC): ICD-10-CM

## 2024-02-06 RX ORDER — METOPROLOL SUCCINATE 25 MG/1
TABLET, EXTENDED RELEASE ORAL
Qty: 45 TABLET | Refills: 4 | Status: SHIPPED | OUTPATIENT
Start: 2024-02-06

## 2024-02-14 ENCOUNTER — HOSPITAL ENCOUNTER (OUTPATIENT)
Dept: RADIOLOGY | Facility: HOSPITAL | Age: 54
Discharge: HOME | End: 2024-02-14
Attending: ORTHOPAEDIC SURGERY
Payer: COMMERCIAL

## 2024-02-14 ENCOUNTER — OFFICE VISIT (OUTPATIENT)
Dept: OBGYN CLINIC | Facility: HOSPITAL | Age: 54
End: 2024-02-14
Payer: COMMERCIAL

## 2024-02-14 VITALS
HEIGHT: 69 IN | DIASTOLIC BLOOD PRESSURE: 82 MMHG | BODY MASS INDEX: 25.34 KG/M2 | HEART RATE: 82 BPM | SYSTOLIC BLOOD PRESSURE: 129 MMHG | WEIGHT: 171.08 LBS

## 2024-02-14 DIAGNOSIS — Z78.0 POSTMENOPAUSAL: ICD-10-CM

## 2024-02-14 DIAGNOSIS — R52 PAIN: ICD-10-CM

## 2024-02-14 DIAGNOSIS — R52 PAIN: Primary | ICD-10-CM

## 2024-02-14 DIAGNOSIS — M53.3 SACROILIAC DYSFUNCTION: ICD-10-CM

## 2024-02-14 DIAGNOSIS — M51.36 DEGENERATIVE DISC DISEASE, LUMBAR: ICD-10-CM

## 2024-02-14 PROCEDURE — 72110 X-RAY EXAM L-2 SPINE 4/>VWS: CPT

## 2024-02-14 PROCEDURE — 99204 OFFICE O/P NEW MOD 45 MIN: CPT | Performed by: ORTHOPAEDIC SURGERY

## 2024-02-14 NOTE — PROGRESS NOTES
Assessment & Plan/Medical Decision Makin y.o. female with Back Pain and Left Gluteal Pain and imaging findings most notable for L5-S1 disc degeneration        The clinical, physical and imaging findings were reviewed with the patient.  Kandis  has a constellation of findings consistent with Sacroiliac Joint Dysfunction and Lumbar Disc Pain in the setting of lumbar degenerative disease.  Worsening pain across low back x1 month.  Fortunately patient remains neurologically intact and functional. Physical exam showing mild TTP left SI joint, decreased lumbar ROM.  We discussed the treatment options including physical therapy, at home exercises, activity modifications, chiropractic medicine, oral medications, interventional spine procedures.  At this time recommend continued conservative treatments.  Referral to pain management for evaluation and treatment. Discussed potential role of steroid injection at or near the source of pain to provide targeted relief.  Referral placed for physical therapy to work on core strengthening, lumbar ROM, strengthening, and stretching exercises. Also to work on SI joint dysfunction.  Will also order DEXA scan to evaluate bone mineral density given postmenopausal with history of fracture.   Patient instructed to return to office/ER sooner if symptoms are not improving, getting worse, or new worrisome/neurologic symptoms arise.  Patient will follow up as needed.     Subjective:      Chief Complaint: Back Pain    HPI:  Kandis Martin is a 53 y.o. female presenting for initial visit with chief complaint of back pain. Has history of chronic low back pain and right sacral fracture in  after being thrown off of horse. Pain improved after physical therapy, rest and medications. Currently, reports worsening low back pain over the past month or so. Did increase peloton and horse back riding recently, however has cut back over the past month with some improvement in pain. Pain  worse with prolonged sitting and going from seated to standing position. Able to walk 7-8 mile without issue. Intermittent numbness into posterior left thigh region. Denies other radiation of pain or numbness/tingling. Denies lower extremity weakness. Denies any bob trauma. Denies fever or chills, no night sweats. Denies any bladder or bowel changes.      Denies heart or lung disease. Denies diabetes or kidney disease.    Conservative therapy includes the following:   Medications: advil as needed    Injections: denies     Physical Therapy: has done PT in the past, maintains at home exercises and stretches  Chiropractic Medicine: has attempted  Accupunture/Massage Therapy: has not attempted   These therapeutic modalities were ineffective at providing sustained pain relief/functional improvement.     Nicotine dependent: denies  Occupation: n/a  Living situation: Lives with family   ADLs: patient is able to perform     Objective:     Family History   Problem Relation Age of Onset    Breast cancer Mother     No Known Problems Father     Diabetes type II Maternal Grandmother     Colon polyps Maternal Grandfather     Alzheimer's disease Paternal Grandmother        Past Medical History:   Diagnosis Date    HOCM (hypertrophic obstructive cardiomyopathy) (HCC)     Migraines     QT prolongation        Current Outpatient Medications   Medication Sig Dispense Refill    Botox 200 units SOLR       buPROPion (WELLBUTRIN XL) 300 mg 24 hr tablet Take 300 mg by mouth Takes 375 mg once daily.      butalbital-acetaminophen-caffeine (FIORICET,ESGIC) -40 mg per tablet as needed Prn      diltiazem (CARDIZEM CD) 180 mg 24 hr capsule TAKE 1 CAPSULE BY MOUTH EVERY DAY 90 capsule 2    DULoxetine (CYMBALTA) 60 mg delayed release capsule Take 20 mg by mouth daily      HYDROcodone-acetaminophen (NORCO) 5-325 mg per tablet       hydrocortisone 2.5 % lotion Apply 2.5 % topically as needed       metoprolol succinate (TOPROL-XL) 25 mg 24 hr  tablet TAKE 1/2 TABLET (12.5 MG TOTAL) BY MOUTH ONCE DAILY 45 tablet 4    Omeprazole 20 MG TBEC as needed       Pseudoephedrine-Ibuprofen (Advil Cold/Sinus)  MG TABS Take by mouth      Cyanocobalamin (VITAMIN B 12 PO) Take by mouth daily (Patient not taking: Reported on 10/29/2021)      HYDROcodone-acetaminophen (ZOLVIT)  mg/15 mL solution Take 1 tablet by mouth as needed  (Patient not taking: Reported on 10/29/2021)      sodium picosulfate, magnesium oxide, citric acid (Clenpiq) 10-3.5-12 MG-GM -GM/160ML SOLN Take 1 kit by mouth see administration instructions Follow instructions given at office 160 mL 0     No current facility-administered medications for this visit.       Past Surgical History:   Procedure Laterality Date    UMBILICAL HERNIA REPAIR      1997       Social History     Socioeconomic History    Marital status: /Civil Union     Spouse name: Not on file    Number of children: Not on file    Years of education: Not on file    Highest education level: Not on file   Occupational History    Occupation: executive   Tobacco Use    Smoking status: Never    Smokeless tobacco: Never   Vaping Use    Vaping status: Never Used   Substance and Sexual Activity    Alcohol use: Not Currently    Drug use: Never    Sexual activity: Not on file   Other Topics Concern    Not on file   Social History Narrative    Not on file     Social Determinants of Health     Financial Resource Strain: Not on file   Food Insecurity: Not on file   Transportation Needs: Not on file   Physical Activity: Not on file   Stress: Not on file   Social Connections: Not on file   Intimate Partner Violence: Not on file   Housing Stability: Not on file       Allergies   Allergen Reactions    Banana - Food Allergy      Tummy upset and diarrhea    Ciprofloxacin     Sulfamethoxazole-Trimethoprim      dizzy         Review of Systems  General- denies fever/chills  HEENT- denies hearing loss or sore throat  Eyes- denies eye pain or  "visual disturbances, denies red eyes  Respiratory- denies cough or SOB  Cardio- denies chest pain or palpitations  GI- denies abdominal pain  Endocrine- denies urinary frequency  Urinary- denies pain with urination  Musculoskeletal- Negative except noted above  Skin- denies rashes or wounds  Neurological- denies dizziness or headache  Psychiatric- denies anxiety or difficulty concentrating    Physical Exam  /82   Pulse 82   Ht 5' 9\" (1.753 m)   Wt 77.6 kg (171 lb 1.2 oz)   BMI 25.26 kg/m²     General/Constitutional: No apparent distress: well-nourished and well developed.  Lymphatic: No appreciable lymphadenopathy  Respiratory: Non-labored breathing  Vascular: No edema, swelling or tenderness, except as noted in detailed exam.  Integumentary: No impressive skin lesions present, except as noted in detailed exam.  Psych: Normal mood and affect, oriented to person, place and time.  MSK: normal other than stated in HPI and exam  Gait & balance: no evidence of myelopathic gait, ambulates Independently     Lumbar spine range of motion:  -Forward flexion to 90  -Extension to neutral  -Lateral bend 25 right, 25 left  -Rotation 25 right, 25 left  There tenderness with palpation along lumbar paraspinal musculature, no midline tenderness     Neurologic:  Lower Extremity Motor Function    Right  Left    Iliopsoas  5/5  5/5    Quadriceps 5/5 5/5   Tibialis anterior  5/5  5/5    EHL  5/5  5/5    Gastroc. muscle  5/5  5/5    Heel rise  5/5  5/5    Toe rise  5/5  5/5      Sensory: light touch is intact to bilateral upper and lower extremities     Reflexes:    Right Left   Patellar 1+ 1+   Achilles 1+ 1+   Babinski neg neg     Other tests:  Straight Leg Raise: negative  Nancy SI: positive left  TATYANA SI: negative  Greater troch: no tenderness   Internal/external hip ROM: intact, no pain   Flexion/extension knee ROM: intact, no pain   Vascular: WWP extremities, 2+DP bilateral      +bilateral tight hamstrings    Diagnostic " "Tests   IMAGING: I have personally reviewed the images and these are my findings:  Lumbar Spine X-rays from 2/14/2024: multi level lumbar spondylosis with loss of disc height notably L5-S1, osteophyte formation and facet hypertrophy, no apparent spondylolisthesis, no appreciated lytic/blastic lesions, no obvious instability    Electronic Medical Records were reviewed including office notes, imaging studies.    Procedures, if performed today     None performed       Portions of the record may have been created with voice recognition software.  Occasional wrong word or \"sound a like\" substitutions may have occurred due to the inherent limitations of voice recognition software.  Read the chart carefully and recognize, using context, where substitutions have occurred.  "

## 2024-02-20 ENCOUNTER — EVALUATION (OUTPATIENT)
Dept: PHYSICAL THERAPY | Age: 54
End: 2024-02-20
Payer: COMMERCIAL

## 2024-02-20 DIAGNOSIS — M51.36 DEGENERATIVE DISC DISEASE, LUMBAR: Primary | ICD-10-CM

## 2024-02-20 DIAGNOSIS — M53.3 SACROILIAC DYSFUNCTION: ICD-10-CM

## 2024-02-20 PROCEDURE — 97161 PT EVAL LOW COMPLEX 20 MIN: CPT | Performed by: PHYSICAL THERAPIST

## 2024-02-20 PROCEDURE — 97110 THERAPEUTIC EXERCISES: CPT | Performed by: PHYSICAL THERAPIST

## 2024-02-20 NOTE — PROGRESS NOTES
PT Evaluation     Today's date: 2024  Patient name: Kandis Martin  : 1970  MRN: [id number]  Referring provider: Bert Solorzano MD  Dx:   Encounter Diagnosis     ICD-10-CM    1. Degenerative disc disease, lumbar  M51.36 Ambulatory referral to Physical Therapy      2. Sacroiliac dysfunction  M53.3 Ambulatory referral to Physical Therapy                     Assessment  Assessment details: Pt reports to PT with cc of lumbar pain that has worsened over last 1-2 years following fall from horse. Pt reports N/T into B LE with L>R. Pt presents with LLD and symptoms consistent with posterior derangement. Pt would benefit form skilled PT in order to improve deficits.   Impairments: abnormal or restricted ROM, abnormal movement, impaired physical strength, lacks appropriate home exercise program and pain with function    Goals  In 4 weeks pt will:  -Be independent with phase I of HEP  -Increase LE strength by 1/2 grade  -Increase Lumber ROM by 25%    By discharge pt will:  -Be independent with Phase II of HEP  -Demonstrate full LE strength  -Demonstrate full Lumbar ROM  -Report minimal pain with ADLs    Plan  Planned therapy interventions: abdominal trunk stabilization, joint mobilization, manual therapy, muscle pump exercises, neuromuscular re-education, patient education, strengthening, stretching, therapeutic activities, therapeutic exercise, functional ROM exercises and home exercise program  Frequency: 2x week  Plan of Care beginning date: 2024  Plan of Care expiration date: 2024  Treatment plan discussed with: patient and PTA      Subjective    Objective     Active Range of Motion     Additional Active Range of Motion Details  Lumbar ROM as % of normal ROM    Flex: 100  Ext:75  R ROT: 50  L ROT:50  R SB:50  L SB:50    Strength/Myotome Testing     Left Knee   Extension: 4    Right Knee   Extension: 4    Left Ankle/Foot   Dorsiflexion: 3+  Plantar flexion: 3+  Great toe extension:  3+    Right Ankle/Foot   Dorsiflexion: 4  Plantar flexion: 4  Great toe extension: 4             Precautions: N/A      Manuals 2/20            ME-L hip ext, R hip flex BALDERAS                                                   Neuro Re-Ed                                                                                                        Ther Ex             Prone lying HEP                                                                                                       Ther Activity                                       Gait Training                                       Modalities

## 2024-02-27 ENCOUNTER — OFFICE VISIT (OUTPATIENT)
Dept: PHYSICAL THERAPY | Age: 54
End: 2024-02-27
Payer: COMMERCIAL

## 2024-02-27 DIAGNOSIS — M53.3 SACROILIAC DYSFUNCTION: ICD-10-CM

## 2024-02-27 DIAGNOSIS — M51.36 DEGENERATIVE DISC DISEASE, LUMBAR: Primary | ICD-10-CM

## 2024-02-27 PROCEDURE — 97110 THERAPEUTIC EXERCISES: CPT | Performed by: SPECIALIST/TECHNOLOGIST

## 2024-02-27 NOTE — PROGRESS NOTES
Daily Note     Today's date: 2024  Patient name: Kandis Martin  : 1970  MRN: [id number]  Referring provider: Bert Solorzano MD  Dx:   Encounter Diagnosis     ICD-10-CM    1. Degenerative disc disease, lumbar  M51.36       2. Sacroiliac dysfunction  M53.3                      Subjective: Pt reports compliance with HEP assigned at IE, improved symptoms.       Objective: See treatment diary below      Assessment: Pt presents with R sided LBP reproduction with max extension, will continue to progress as able.  No exacerbation of symptoms with cores stabilization therex assigned this visit. Tolerated treatment well. Patient demonstrated fatigue post treatment, exhibited good technique with therapeutic exercises, and would benefit from continued PT      Plan: Continue per plan of care.  Progress treatment as tolerated.       Precautions: N/A      Manuals            ME-L hip ext, R hip flex BALDERAS self                                                  Neuro Re-Ed                                                                                                        Ther Ex             TM Walking  5'           Prone lying HEP 2x5'           PPU  10x                        TA w Iso Hip Add  15x5s           Supine TA w Hip Abd Iso  15x blue                        Pball Press Down  20x C, 15x L/R                        Ther Activity                                       Gait Training                                       Modalities

## 2024-02-28 DIAGNOSIS — I42.1 HOCM (HYPERTROPHIC OBSTRUCTIVE CARDIOMYOPATHY) (HCC): ICD-10-CM

## 2024-02-28 RX ORDER — DILTIAZEM HYDROCHLORIDE 180 MG/1
CAPSULE, COATED, EXTENDED RELEASE ORAL
Qty: 90 CAPSULE | Refills: 0 | Status: SHIPPED | OUTPATIENT
Start: 2024-02-28

## 2024-02-29 ENCOUNTER — OFFICE VISIT (OUTPATIENT)
Dept: PHYSICAL THERAPY | Age: 54
End: 2024-02-29
Payer: COMMERCIAL

## 2024-02-29 ENCOUNTER — HOSPITAL ENCOUNTER (OUTPATIENT)
Dept: NON INVASIVE DIAGNOSTICS | Facility: CLINIC | Age: 54
Discharge: HOME | End: 2024-02-29
Payer: COMMERCIAL

## 2024-02-29 VITALS
HEIGHT: 69 IN | WEIGHT: 171 LBS | HEART RATE: 82 BPM | SYSTOLIC BLOOD PRESSURE: 129 MMHG | DIASTOLIC BLOOD PRESSURE: 82 MMHG | BODY MASS INDEX: 25.33 KG/M2

## 2024-02-29 DIAGNOSIS — M51.36 DEGENERATIVE DISC DISEASE, LUMBAR: Primary | ICD-10-CM

## 2024-02-29 DIAGNOSIS — I42.1 HOCM (HYPERTROPHIC OBSTRUCTIVE CARDIOMYOPATHY) (HCC): ICD-10-CM

## 2024-02-29 LAB
AORTIC ROOT: 3.3 CM
APICAL FOUR CHAMBER EJECTION FRACTION: 67 %
ASCENDING AORTA: 3.2 CM
AV AREA PEAK VELOCITY: 3.4 CM2
AV LVOT MEAN GRADIENT: 2 MMHG
AV LVOT PEAK GRADIENT: 4 MMHG
AV PEAK GRADIENT: 5 MMHG
BSA FOR ECHO PROCEDURE: 1.93 M2
DOP CALC LVOT AREA: 3.8 CM2
DOP CALC LVOT CARDIAC INDEX: 2.73 L/MIN/M2
DOP CALC LVOT CARDIAC OUTPUT: 5.28 L/MIN
DOP CALC LVOT DIAMETER: 2.2 CM
DOP CALC LVOT PEAK VEL VTI: 21.25 CM
DOP CALC LVOT PEAK VEL: 0.97 M/S
DOP CALC LVOT STROKE INDEX: 39.9 ML/M2
DOP CALC LVOT STROKE VOLUME: 80.74
E WAVE DECELERATION TIME: 179 MS
E/A RATIO: 0.55
FRACTIONAL SHORTENING: 39 (ref 28–44)
GLOBAL LONGITUIDAL STRAIN: -16 %
INTERVENTRICULAR SEPTUM IN DIASTOLE (PARASTERNAL SHORT AXIS VIEW): 2 CM
INTERVENTRICULAR SEPTUM: 2 CM (ref 0.6–1.1)
LAAS-AP2: 18.1 CM2
LAAS-AP4: 20.4 CM2
LEFT ATRIUM SIZE: 4.1 CM
LEFT ATRIUM VOLUME (MOD BIPLANE): 58 ML
LEFT ATRIUM VOLUME INDEX (MOD BIPLANE): 30.1 ML/M2
LEFT INTERNAL DIMENSION IN SYSTOLE: 2.2 CM (ref 2.1–4)
LEFT VENTRICULAR INTERNAL DIMENSION IN DIASTOLE: 3.6 CM (ref 3.5–6)
LEFT VENTRICULAR POSTERIOR WALL IN END DIASTOLE: 1.1 CM
LEFT VENTRICULAR STROKE VOLUME: 38 ML
LVSV (TEICH): 38 ML
MV E'TISSUE VEL-LAT: 7 CM/S
MV E'TISSUE VEL-SEP: 5 CM/S
MV PEAK A VEL: 0.85 M/S
MV PEAK E VEL: 47 CM/S
MV STENOSIS PRESSURE HALF TIME: 52 MS
MV VALVE AREA P 1/2 METHOD: 4.23
RA PRESSURE ESTIMATED: 3 MMHG
RIGHT ATRIUM AREA SYSTOLE A4C: 15 CM2
RIGHT VENTRICLE ID DIMENSION: 3.3 CM
RV PSP: 29 MMHG
SL CV ECHO LV DYNAMIC OBSTRUCTION PEAK GRADIENT (REST): 10 MMHG
SL CV ECHO LV DYNAMIC OBSTRUCTION PEAK GRADIENT (VALSAL: 25 MMHG
SL CV LEFT ATRIUM LENGTH A2C: 5.1 CM
SL CV LV EF: 67
SL CV PED ECHO LEFT VENTRICLE DIASTOLIC VOLUME (MOD BIPLANE) 2D: 54 ML
SL CV PED ECHO LEFT VENTRICLE SYSTOLIC VOLUME (MOD BIPLANE) 2D: 16 ML
TR MAX PG: 26 MMHG
TR PEAK VELOCITY: 2.6 M/S
TRICUSPID ANNULAR PLANE SYSTOLIC EXCURSION: 2.1 CM
TRICUSPID VALVE PEAK REGURGITATION VELOCITY: 2.57 M/S

## 2024-02-29 PROCEDURE — 93356 MYOCRD STRAIN IMG SPCKL TRCK: CPT | Performed by: INTERNAL MEDICINE

## 2024-02-29 PROCEDURE — 97110 THERAPEUTIC EXERCISES: CPT | Performed by: PHYSICAL THERAPIST

## 2024-02-29 PROCEDURE — 93306 TTE W/DOPPLER COMPLETE: CPT

## 2024-02-29 PROCEDURE — 93356 MYOCRD STRAIN IMG SPCKL TRCK: CPT

## 2024-02-29 PROCEDURE — 93306 TTE W/DOPPLER COMPLETE: CPT | Performed by: INTERNAL MEDICINE

## 2024-02-29 NOTE — PROGRESS NOTES
Daily Note     Today's date: 2024  Patient name: Kandis Martin  : 1970  MRN: [id number]  Referring provider: Bert Solorzano MD  Dx:   Encounter Diagnosis     ICD-10-CM    1. Degenerative disc disease, lumbar  M51.36                      Subjective: Some improvements       Objective: See treatment diary below      Assessment: Tolerated treatment well. Patient demonstrated fatigue post treatment, would benefit from continued PT, and pt demonstrates improved lumbar extension      Plan: Continue per plan of care.  Progress treatment as tolerated.       Precautions: N/A      Manuals           ME-L hip ext, R hip flex BALDERAS self           PA mobs   BALDERAS                                    Neuro Re-Ed                                                                                                        Ther Ex             TM Walking  5'           Prone lying HEP 2x5' 2x5'          PPU  10x                        TA w Iso Hip Add  15x5s           Supine TA w Hip Abd Iso  15x blue           Pallof press   5# 2x10           Pball Press Down  20x C, 15x L/R 20x C, 15x L/R                       Ther Activity                                       Gait Training                                       Modalities

## 2024-03-26 ENCOUNTER — OFFICE VISIT (OUTPATIENT)
Dept: CARDIOLOGY CLINIC | Facility: CLINIC | Age: 54
End: 2024-03-26
Payer: COMMERCIAL

## 2024-03-26 VITALS
SYSTOLIC BLOOD PRESSURE: 116 MMHG | DIASTOLIC BLOOD PRESSURE: 80 MMHG | WEIGHT: 174 LBS | BODY MASS INDEX: 25.77 KG/M2 | OXYGEN SATURATION: 99 % | HEART RATE: 73 BPM | HEIGHT: 69 IN

## 2024-03-26 DIAGNOSIS — R94.31 QT PROLONGATION: ICD-10-CM

## 2024-03-26 DIAGNOSIS — I42.1 HOCM (HYPERTROPHIC OBSTRUCTIVE CARDIOMYOPATHY) (HCC): Primary | ICD-10-CM

## 2024-03-26 DIAGNOSIS — R55 SYNCOPE AND COLLAPSE: ICD-10-CM

## 2024-03-26 PROCEDURE — 99214 OFFICE O/P EST MOD 30 MIN: CPT | Performed by: INTERNAL MEDICINE

## 2024-03-26 RX ORDER — DILTIAZEM HYDROCHLORIDE 180 MG/1
180 CAPSULE, COATED, EXTENDED RELEASE ORAL DAILY
Qty: 90 CAPSULE | Refills: 3 | Status: SHIPPED | OUTPATIENT
Start: 2024-03-26

## 2024-03-26 RX ORDER — CETIRIZINE HYDROCHLORIDE 5 MG/1
5 TABLET ORAL DAILY
COMMUNITY

## 2024-03-26 RX ORDER — ESTRADIOL 0.1 MG/D
FILM, EXTENDED RELEASE TRANSDERMAL
COMMUNITY
Start: 2024-02-27

## 2024-03-26 RX ORDER — PROGESTERONE 100 MG/1
CAPSULE ORAL
COMMUNITY
Start: 2024-01-01

## 2024-03-26 NOTE — PROGRESS NOTES
HCM Clinic Follow-up Visit - Cardiology   Kandis Martin 53 y.o. female MRN: [id number]  Unit/Bed#:  Encounter: [id number]    Patient Active Problem List    Diagnosis Date Noted    Facet arthropathy, lumbar 02/01/2022    QT prolongation 12/12/2021    HOCM (hypertrophic obstructive cardiomyopathy) (HCC) 12/12/2021    Syncope and collapse 12/12/2021    Depression, unspecified 12/12/2021    Abnormal thyroid function test 10/29/2021    H/O hyperthyroidism 10/29/2021     Plan: Since her last office visit on 12-6-2022 she has kept up with her colonoscopies for screening purposes (most recent 2023) with a few diverticula noted in sigmoid colon but no other abnormalities noted. She was seen by the Orthopedic team in February for back/left gluteal pain with imaging noting L5-S1 disc degeneration. A referral was placed for pain management as well as physical therapy. A DEXA scan was recommended as well. She had an echocardiogram performed on 2- which showed: Left Ventricle: Wall thickness is severely increased. There is severe asymmetric hypertrophy of the septal wall (maximum wall thickness 23 mm). The left ventricular ejection fraction is 67%. Systolic function is vigorous. Global longitudinal strain is reduced at -16%. Wall motion is normal. Diastolic function is mildly abnormal, consistent with grade I (abnormal) relaxation. There is no LV dynamic obstruction with a peak gradient of 10.0 mmHg at rest and 25.0 mmHg with Valsalva. Left Atrium: The atrium is mildly dilated. Mitral Valve: There is mild regurgitation. There is systolic anterior motion without late peaking gradient.    Today she notes she is unfortunately not working after being laid off from her prior position. She did have COVID a few weeks ago and since that time has not been exercising regularly. She does try to stay active by riding horses 5-6 days a week and does occasionally use her Peloton but does not have a regular exercise routine at  this time. While performing these activities she has no cardiac complaints. She has, however, noted lightheadedness with standing quickly. She does occasionally deal with vertigo, mostly during the Spring months and did have an hour long episode last night. She has prior history of vestibular migraines as well. Her blood pressure is acceptable today and she remains on metoprolol succinate 25 mg daily and diltiazem 180 mg daily. A low sodium diet was reinforced. She has had blood work with her primary physician in [place] and will send us reports. We have encouraged her to remain active, refrain from strenuous exercise, and follow a heart healthy diet. Family history was updated. A 53 year old brother now has cardiac problem and likely has HCM.She will return to the office in 6 months.    Physician Requesting Consult: Dr Blanca Cardenas  Reason for Consult / Principal Problem: HOCM     HPI: Ms Kandis Martin is a 52 year old female with history of hyperthyroidism, vestibular migraines and recent diagnosis of hypertrophic cardiomyopathy was referred to HCM Clinic for further management. Initial cardiac workup was initiated for shortness of breath and chest pain and included EKG which showed biatrial enlargement. That led to TTE in  (in  [place], [place] ) which was significant for mild concentric left ventricular hypertrophy, and severe left atrial enlargement.  She followed with [facility] and had a repeat TTE on 10/01/2021 which showed normal left ventricular systolic function (LVEF 65% ), reduced global longitudinal strain (-14.2%) with severe regional reduction in the basal -mid anteroseptal wall, no regional wall motion abnormalities, severe asymmetric hypertrophy of septum (2 cm), mildly increased right ventricle, mild systolic anterior motion of anterior leaflet of mitral valve, trace mitral valve and tricuspid valve regurgitations.  Cardiac MRI from 10/25/2021  "revealed findings consistent with hypertrophic cardiomyopathy, such as increased myocardial wall thickness at mid anteroseptal and inferoseptal walls with subtle hazy delayed mid myocardial enhancement throughout the mid septum,  mild systolic motion of chordae of the anterior mitral valve leaflet, as well as mild left atrial enlargement.  Extended ambulatory heart monitoring (12 days 18 hours) from  was mostly benign with 1 run of supraventricular tachycardia (4 beats at 167 beats per minute). Mrs Martin was seen in [place] [facility] on 12/13/2021 after a mechanical fall from her horse. Later that day patient had presyncopal episode after getting up quickly to use the restroom which preceded by intense pain in the sacral area.  She admits to had taken multiple pain medications as well as muscle relaxant prior to the episode.  She also admits to dehydration during the day.  Describes feeling of diaphoresis and \"heat rush\" prior to  presyncope.  X-ray during ER visit was negative for fracture but MRI done at [facility] on 12/22 showed sacral fracture with bone contusions.   Mrs. Martin's other medical history includes transient episode of hyperthyroidism for which she was treated with methimazole with later improvement in thyroid functions. as well as diet-controlled hyperlipidemia.    Mrs Martin was physically active prior to sacral fracture with riding horses 3-4 times per week, doing pilates and using Peloton bike.   After sacral fracture she worked with physical therapy.  Her diet is mainly heart healthy although admit to occasional consumption of milk chocolate. She is able to maintain her weight. We encouraged her to continue with heart-healthy and salt-restricted diet and continue with physical activities as she tolerates.      1-: Mrs. Martin's TTE and cardiac MRI findings are suggestive of hypertrophic cardiomyopathy.  Currently she is mostly asymptomatic.  She rides horses " 3-4 times per week and undergoing exercise training for competitive horse riding, which she would like to continue.  We had an extensive discussion about avoiding strenuous exercises in view of recent diagnosis of HCM.  So far her workup is not suggestive of high risk HCM. Circumstances of the most recent episode of pre-syncope are suggestive of vaso-vagal etiology. Will proceed with stress echocardiography to evaluate for provokable symptoms, provokable arrhythmia as well as hemodynamic response to exercise  which will complete risk stratification for sudden cardiac death as well as will help guiding recommendations for exercise intensity.  She takes diltiazem 180 mg daily as part of migraine management.  Will not make changes to medications.  During ER visit on 12/13/2021 EKG showed prolonged QTc (595 ms) which was attributed to bupropion and duloxetine.  EKG during today's visit with improvement in QTc (426 ms).  Mrs. Martin will be seen HCM Clinic in 3 months.    Ms. Martin was seen and evaluated with Dr. Cabrera. Since last HCM clinic visit she had the following tests done:     2- HAL (Bike):    Stress ECG: No ST deviation is noted. The ECG was not diagnostic due to submaximal stress.    Left Ventricle: Left ventricular cavity size is normal. Wall thickness is severely increased. The left ventricular ejection fraction is 65%. Systolic function is normal.  Left ventricular global longitudinal strain is reduced at -12% . Wall motion is normal. There is severe asymmetric hypertrophy of the septal wall. Diastolic function is abnormal. There is outflow tract dynamic obstruction at rest. There is outflow tract dynamic obstruction with valsalva.    Mitral Valve: There is systolic anterior motion of the posterior leaflet and anterior leaflet.    Post Stress Echo: Left ventricle cavity has normal reduction in size post-stress. The left ventricle systolic function is hyperdynamic post-stress. The  post-stress echo showed normal wall motion.    Stress ECG: Overall, the patient's exercise capacity was mildly impaired for their age. The patient reached stage 5.0 of the protocol after exercising for 8 min and 43 sec and had a maximal HR of 130 bpm (77 % of MPHR) and 6.3 METS. The patient experienced no angina during the test. The patient achieved their maximal exercise threshold. The patient reported dyspnea and fatigue during the stress test. Symptoms began during stress and ended during recovery.    Echo Post Impression: Wall motion is normal after submaximal stress. A gradient of approximately 40 mm Hg was noted at peak stress. Target heart rate was not reached.  Study was notable for a gradient of approximately 40 mm Hg at peak stress but did not achieve target heart rate. Patient had a gradient of approximately 30 mm Hg with Valsalva at rest. Patient did not achieve target heart rate - but no wall motion abnormalities were noted at peak stress. Findings are consistent with hypertrophic non-obstructive cardiomyopathy. No evidence of arrhythmia noted during study.           Today, she reports being mostly asymptomatic from cardiac standpoint. She continues travelling for business purposes. She continues riding horses multiple times per week but avoids intense interval training. Ms. Martin still gets dizziness episodes related to vestibular migraine.  She originaly was prescribed metoprolol succinate 25 mg daily but she takes 12.5 mg daily due to side effect of fatigue. Her blood pressure is controlled on the current regimen. She has no resting or provoked murmur on exam. 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 We will proceed with annual TTE in 2 -2023 and she will be seen in HCM clinic in 12 month.      Family history: mother, alive, 72 year old, patient is not sure of her cardiac  "conditions; father, alive 74, no cardiac conditions. She has 2 brothers: 53 year old with narcolepsy and an \"arrhythmia\" and 42 year old with severe anxiety both of which she believes have not been screened. Mrs Martin has no children. Paternal grandmother had hypertension. No history of sudden death or cardiomypathy in close or distant family.      Genetic testing ([employer] Change Card 2-):           Review of Systems: Denies dyspnea, chest pain, palpitations, lower extremity swelling, or no syncopal episodes since last HCM clinic visit.  She has history of dizziness due to vestibular vertigo.     Historical Information   Past Medical History:   Diagnosis Date    HOCM (hypertrophic obstructive cardiomyopathy) (HCC)     Migraines     QT prolongation      Past Surgical History:   Procedure Laterality Date    UMBILICAL HERNIA REPAIR      1997     Family History   Problem Relation Age of Onset    Breast cancer Mother     No Known Problems Father     Diabetes type II Maternal Grandmother     Colon polyps Maternal Grandfather     Alzheimer's disease Paternal Grandmother      Current Outpatient Medications on File Prior to Visit   Medication Sig Dispense Refill    Botox 200 units SOLR       buPROPion (WELLBUTRIN XL) 300 mg 24 hr tablet Take 300 mg by mouth Takes 375 mg once daily.      butalbital-acetaminophen-caffeine (FIORICET,ESGIC) -40 mg per tablet as needed Prn      Cyanocobalamin (VITAMIN B 12 PO) Take by mouth daily (Patient not taking: Reported on 10/29/2021)      diltiazem (CARDIZEM CD) 180 mg 24 hr capsule TAKE 1 CAPSULE BY MOUTH EVERY DAY 90 capsule 0    DULoxetine (CYMBALTA) 60 mg delayed release capsule Take 20 mg by mouth daily      HYDROcodone-acetaminophen (NORCO) 5-325 mg per tablet       HYDROcodone-acetaminophen (ZOLVIT)  mg/15 mL solution Take 1 tablet by mouth as needed  (Patient not taking: Reported on 10/29/2021)      hydrocortisone 2.5 % lotion Apply 2.5 % topically as needed       " "metoprolol succinate (TOPROL-XL) 25 mg 24 hr tablet TAKE 1/2 TABLET (12.5 MG TOTAL) BY MOUTH ONCE DAILY 45 tablet 4    Omeprazole 20 MG TBEC as needed       Pseudoephedrine-Ibuprofen (Advil Cold/Sinus)  MG TABS Take by mouth      sodium picosulfate, magnesium oxide, citric acid (Clenpiq) 10-3.5-12 MG-GM -GM/160ML SOLN Take 1 kit by mouth see administration instructions Follow instructions given at office 160 mL 0     No current facility-administered medications on file prior to visit.     Allergies   Allergen Reactions    Banana - Food Allergy      Tummy upset and diarrhea    Ciprofloxacin     Sulfamethoxazole-Trimethoprim      dizzy       Social History     Substance and Sexual Activity   Alcohol Use Not Currently     Social History     Substance and Sexual Activity   Drug Use Never     Social History     Tobacco Use   Smoking Status Never   Smokeless Tobacco Never     Objective   Vitals: Visit Vitals  /80 (BP Location: Right arm, Patient Position: Sitting, Cuff Size: Large)   Pulse 73   Ht 5' 9\" (1.753 m)   Wt 78.9 kg (174 lb)   SpO2 99%   BMI 25.70 kg/m²   OB Status Postmenopausal   Smoking Status Never   BSA 1.95 m²        Invasive Devices       None                 Physical Exam:  GEN: Kandis Martin appears well, alert and oriented x 3, pleasant and cooperative   HEENT: pupils equal, round, and reactive to light; extraocular muscles intact  NECK: supple, no carotid bruits   HEART: regular rhythm, normal S1 and S2, 1/6 systolic murmur noted that accentuated with standing, no clicks, gallops or rubs   LUNGS: clear to auscultation bilaterally; no wheezes, rales, or rhonchi   ABDOMEN: normal bowel sounds, soft, no tenderness, no distention  EXTREMITIES: peripheral pulses normal; no clubbing, cyanosis, or edema  NEURO: no focal findings   SKIN: normal without suspicious lesions on exposed skin    Lab Results:   Lab Results   Component Value Date    WBC 11.27 (H) 12/12/2021    RBC 4.59 12/12/2021 " "   HGB 13.8 12/12/2021    HCT 43.3 12/12/2021    MCV 94 12/12/2021     12/12/2021    RDW 12.1 12/12/2021     Lab Results   Component Value Date    K 4.6 12/12/2021     12/12/2021    CO2 27 12/12/2021    BUN 20 12/12/2021    CREATININE 0.98 12/12/2021    EGFR 66 12/12/2021    CALCIUM 9.9 12/12/2021    AST 21 09/27/2021    ALT 50 09/27/2021    ALKPHOS 108 09/27/2021     No results found for: \"MG\"  No results found for: \"CHOL\", \"HDL\", \"TRIG\", \"LDLCALC\"  Lab Results   Component Value Date    HON5KHSRIEST 0.336 (L) 09/27/2021    FREET4 0.72 (L) 09/27/2021    U9BILPW 0.80 09/27/2021     Imaging:   I have personally reviewed pertinent films in PACS  Echo complete w/ contrast if indicated    Result Date: 2/29/2024  Narrative:   Left Ventricle: Wall thickness is severely increased. There is severe asymmetric hypertrophy of the septal wall (maximum wall thickness 23 mm). The left ventricular ejection fraction is 67%. Systolic function is vigorous. Global longitudinal strain is reduced at -16%. Wall motion is normal. Diastolic function is mildly abnormal, consistent with grade I (abnormal) relaxation. There is no LV dynamic obstruction with a peak gradient of 10.0 mmHg at rest and 25.0 mmHg with Valsalva.   Left Atrium: The atrium is mildly dilated.   Mitral Valve: There is mild regurgitation. There is systolic anterior motion without late peaking gradient. Strain was performed to quantify interventricular dyssynchrony and evaluate components of myocardial function due to  HCM. Results from the utilization of Strain Analysis are listed in the report below.     Cardiac testing:   Results for orders placed during the hospital encounter of 10/01/21    Echo complete with contrast if indicated    Narrative  [address] [address]    Transthoracic Echocardiogram  2D, M-mode, Doppler, and Color Doppler    Study date:  01-Oct-2021    Patient: JAMI WINTER  MR " number: [id number]  Account number: [id number]  : 1970  Age: 51 years  Gender: Female  Status: Outpatient  Location: [facility]  Height: 69 in  Weight: 163 lb  BP: 120/ 76 mmHg    Indications: Palpitations    Diagnoses: R00.2 - Palpitations    Sonographer:  ASHLEY Preciado  Referring Physician:  Yaquelin Cardenas MD  Group:  [facility]  Interpreting Physician:  Patel Nolasco MD    IMPRESSIONS:  The echocardiographic study was abnormal. Features are consistent with non-obstructive hypertrophic cardiomyopathy with severe asymmetric septal hypertrophy.    SUMMARY    LEFT VENTRICLE:  Systolic function was normal. Ejection fraction was estimated to be 65 %. The peak global longitudinal strain was reduced at -14.2%, with severe regional reductions in the basal-mid anteroseptal wall.  There were no regional wall motion abnormalities.  Wall thickness was markedly increased (IVSd 2.0 cm).  There was severe assymetrical hypertrophy of the septum.    RIGHT VENTRICLE:  Wall thickness was mildly increased.    MITRAL VALVE:  There was mild systolic anterior motion of the anterior leaflet.  There was trace regurgitation.    TRICUSPID VALVE:  There was trace regurgitation.    HISTORY: PRIOR HISTORY: Palpitations    PROCEDURE: The study was performed in the [facility]. This was a routine study. The transthoracic approach was used. The study included complete 2D imaging, M-mode, complete spectral Doppler, and color Doppler. The heart rate was 83  bpm, at the start of the study.    LEFT VENTRICLE: Size was normal. Systolic function was normal. Ejection fraction was estimated to be 65 %. The peak global longitudinal strain was reduced at -14.2%, with severe regional reductions in the basal-mid anteroseptal wall. There  were no regional wall motion abnormalities. Wall thickness was markedly increased (IVSd 2.0 cm). There was severe assymetrical hypertrophy of the septum. No evidence of  apical thrombus. DOPPLER: There was fusion of early and atrial  contributions to ventricular filling.    RIGHT VENTRICLE: The size was normal. Systolic function was normal. Wall thickness was mildly increased.    LEFT ATRIUM: Size was normal.    RIGHT ATRIUM: Size was normal.    MITRAL VALVE: Valve structure was normal. There was normal leaflet separation. There was mild systolic anterior motion of the anterior leaflet. DOPPLER: The transmitral velocity was within the normal range. There was no evidence for  stenosis. There was trace regurgitation.    AORTIC VALVE: There was no evidence of left ventricular outflow obstruction. The valve was trileaflet. Leaflets exhibited normal thickness and normal cuspal separation. DOPPLER: Transaortic velocity was within the normal range. There was  no evidence for stenosis. There was no significant regurgitation.    TRICUSPID VALVE: The valve structure was normal. There was normal leaflet separation. DOPPLER: The transtricuspid velocity was within the normal range. There was no evidence for stenosis. There was trace regurgitation.    PULMONIC VALVE: Leaflets exhibited normal thickness, no calcification, and normal cuspal separation. DOPPLER: The transpulmonic velocity was within the normal range. There was no significant regurgitation.    PERICARDIUM: There was no pericardial effusion. The pericardium was normal in appearance.    AORTA: The root exhibited normal size.    SYSTEMIC VEINS: IVC: The inferior vena cava was normal in size.    SYSTEM MEASUREMENT TABLES    2D  %FS: 42.17 %  AVC: 346.25 ms  Ao Diam: 3.12 cm  EDV(Teich): 56.63 ml  EF(Teich): 74.05 %  ESV(Teich): 14.7 ml  IVSd: 2.25 cm  LA Diam: 3.62 cm  LAAs A4C: 16.92 cm2  LAESV A-L A4C: 46.49 ml  LAESV MOD A4C: 44.36 ml  LALs A4C: 5.23 cm  LVEDV MOD A4C: 54.1 ml  LVEF MOD A4C: 69.1 %  LVESV MOD A4C: 16.72 ml  LVIDd: 3.66 cm  LVIDs: 2.12 cm  LVLd A4C: 7.68 cm  LVLs A4C: 5.29 cm  LVPWd: 0.96 cm  RAAs A4C: 14.61 cm2  RAESV  "A-L: 36.79 ml  RAESV MOD: 35.1 ml  RALs: 4.93 cm  RVIDd: 3.24 cm  RWT: 0.52  SV MOD A4C: 37.39 ml  SV(Teich): 41.93 ml    MM  TAPSE: 2.19 cm    [facility]    Prepared and electronically signed by    Patel Nolasco MD  Signed 01-Oct-2021 14:17:05    Name: Kandis Martin                       : 1970  MRN: [id number]                       Age: 53 y.o.  Patient Status: Outpatient          Gender: female  Echo complete w/ strain    Height: 5' 9\" (1.753 m)   Weight: 77.6 kg (171 lb)   BSA: 1.93 m²   Blood Pressure: 129/82    Date of Study: 24   Ordering Provider: Ni Cabrera DO   Clinical Indications: HOCM (hypertrophic obstructive cardiomyopathy) (HCC) [I42.1 (ICD-10-CM)]       Reading Physicians  Performing Staff   Cardiology: Johnny Barboza MD    Tech: Peggy Mittal         Vitals    Height Weight BSA (Calculated - m2) BP Pulse   5' 9\" (1.753 m) 77.6 kg (171 lb) 1.93 sq meters 129/82 82     PACS Images     Show images for Echo complete w/ contrast if indicated  Study Details    This transthoracic echocardiogram was performed in the echo lab. This was a routine, outpatient study. Study quality was adequate. A complete 2D, color flow Doppler, spectral Doppler, 2D, color flow Doppler, spectral Doppler and strain transthoracic echocardiogram was performed.  The apical, parasternal, subcostal and suprasternal views were obtained.     History    HOCM, Hyperthyroidism, Syncope and collapse     Interpretation Summary         Left Ventricle: Wall thickness is severely increased. There is severe asymmetric hypertrophy of the septal wall (maximum wall thickness 23 mm). The left ventricular ejection fraction is 67%. Systolic function is vigorous. Global longitudinal strain is reduced at -16%. Wall motion is normal. Diastolic function is mildly abnormal, consistent with grade I (abnormal) relaxation. There is no LV dynamic obstruction with a " peak gradient of 10.0 mmHg at rest and 25.0 mmHg with Valsalva.    Left Atrium: The atrium is mildly dilated.    Mitral Valve: There is mild regurgitation. There is systolic anterior motion without late peaking gradient.     Strain was performed to quantify interventricular dyssynchrony and evaluate components of myocardial function due to  HCM. Results from the utilization of Strain Analysis are listed in the report below.     Findings    Left Ventricle Left ventricular cavity size is small. Wall thickness is severely increased. There is severe asymmetric hypertrophy of the septal wall (maximum wall thickness 23 mm). The left ventricular ejection fraction is 67%. Systolic function is vigorous. Global longitudinal strain is reduced at -16%.  Wall motion is normal. Diastolic function is mildly abnormal, consistent with grade I (abnormal) relaxation.  There is no LV dynamic obstruction with a peak gradient of 10.0 mmHg at rest and 25.0 mmHg with Valsalva.   Right Ventricle Right ventricular cavity size is normal. Systolic function is normal. Wall thickness is normal.   Left Atrium The atrium is mildly dilated.   Right Atrium The atrium is normal in size.   Aortic Valve The aortic valve is trileaflet. The leaflets are not thickened. The leaflets are not calcified. The leaflets exhibit normal mobility. There is no evidence of regurgitation. The aortic valve has no significant stenosis.   Mitral Valve Mitral valve structure is normal.  There is mild regurgitation. There is no evidence of stenosis. There is systolic anterior motion without late peaking gradient.   Tricuspid Valve Tricuspid valve structure is normal. There is trace regurgitation. There is no evidence of stenosis. The right ventricular systolic pressure is normal. The estimated right ventricular systolic pressure is 29.00 mmHg.   Pulmonic Valve Pulmonic valve structure is normal. There is trace regurgitation. There is no evidence of stenosis.   Ascending  Aorta The aortic root is normal in size.   IVC/SVC The right atrial pressure is estimated at 3.0 mmHg. The inferior vena cava is normal in size.   Pericardium There is no pericardial effusion. The pericardium is normal in appearance.     Left Ventricle Measurements    Function/Volumes   A4C EF 67 %         LVOT stroke volume 80.74         LVOT stroke volume index 39.9 ml/m2         LVOT Cardiac Output 5.28 l/min         LVOT Cardiac Index 2.73 l/min/m2         Dimensions   LVIDd 3.6 cm         LVIDS 2.2 cm         IVSd 2 cm         LVPWd 1.1 cm         LVOT area 3.8 cm2         FS 39         Diastolic Filling   MV E' Tissue Velocity Septal 5 cm/s         MV E' Tissue Velocity Lateral 7 cm/s         LA Volume Index (BP) 30.1 mL/m2         E/A ratio 0.55         E wave deceleration time 179 ms         MV Peak E Sebastien 47 cm/s         MV Peak A Sebastien 0.85 m/s         Strain   GLS -16 %          Report Measurements   AV LVOT peak gradient 4 mmHg         Other Measurements   Peak gradient (Rest) 10 mmHg         Peak Gradient (Valsalva) 25 mmHg              Interventricular Septum Measurements    Shunt Ratio   LVOT peak VTI 21.25 cm         LVOT peak sebastien 0.97 m/s              Right Ventricle Measurements    Dimensions   RVID d 3.3 cm         Tricuspid annular plane systolic excursion 2.1 cm               Left Atrium Measurements    Dimensions   LA size 4.1 cm         LA length (A2C) 5.1 cm         Volumes   LA volume (BP) 58 mL         LA Volume Index (BP) 30.1 mL/m2               Right Atrium Measurements    Dimensions   RAA A4C 15 cm2               Atrial Septum Measurements    Shunt Ratio   LVOT peak VTI 21.25 cm         LVOT peak sebastien 0.97 m/s               Aortic Valve Measurements    Stenosis   LVOT peak sebastien 0.97 m/s         LVOT peak VTI 21.25 cm         LVOT mn grad 2 mmHg         AV peak gradient 5 mmHg         AV LVOT peak gradient 4 mmHg         Area/Dimensions   AV area peak sebastien 3.4 cm2         LVOT diameter 2.2 cm          LVOT area 3.8 cm2               Mitral Valve Measurements    Stenosis   MV stenosis pressure 1/2 time 52 ms         MV valve area p 1/2 method 4.23               Tricuspid Valve Measurements    RVSP Parameters   TR Peak Sebastien 2.6 m/s         Est. RA pres 3 mmHg         Triscuspid Valve Regurgitation Peak Gradient 26 mmHg         Right Ventricular Peak Systolic Pressure 29 mmHg               Aorta Measurements    Aortic Dimensions   Ao root 3.3 cm         Asc Ao 3.2 cm               IVC/SVC Measurements    IVC/SVC   Est. RA pres 3 mmHg              Exam Details    Performed Procedure Technologist Supporting Staff Performing Physician   Echo complete w/ strain JOEL Romero           Appointment Date/Status Modality Department    2/29/2024     Completed [facility] ECHO 2 [facility] CAR NON INV           Begin Exam End Exam  End Exam Questionnaires   2/29/2024  8:04 AM 2/29/2024  8:50 AM  PATIENT EDUCATION            All Reviewers List    Johnny Barboza MD on 3/1/2024  1:31 PM   Ni Cabrera DO on 2/29/2024 10:27 PM     Signed    Electronically signed by Johnny Barboza MD on 2/29/24 at 1105 EST     Counseling / Coordination of Care  Total floor / unit time spent today 40 minutes.  Greater than 50% of total time was spent with the patient and / or family counseling and / or coordination of care.

## 2024-04-03 ENCOUNTER — EVALUATION (OUTPATIENT)
Dept: PHYSICAL THERAPY | Facility: CLINIC | Age: 54
End: 2024-04-03
Payer: COMMERCIAL

## 2024-04-03 DIAGNOSIS — R42 DIZZINESS AND GIDDINESS: Primary | ICD-10-CM

## 2024-04-03 DIAGNOSIS — H81.12 BPPV (BENIGN PAROXYSMAL POSITIONAL VERTIGO), LEFT: ICD-10-CM

## 2024-04-03 PROCEDURE — 97112 NEUROMUSCULAR REEDUCATION: CPT

## 2024-04-03 PROCEDURE — 97162 PT EVAL MOD COMPLEX 30 MIN: CPT

## 2024-04-03 NOTE — PROGRESS NOTES
PT Evaluation     Today's date: 4/3/2024  Patient name: Kandis Martin  : 1970  MRN: [id number]  Referring provider: Stephanie Goodrich  Dx:   Encounter Diagnosis     ICD-10-CM    1. Dizziness and giddiness  R42       2. BPPV (benign paroxysmal positional vertigo), left  H81.12           Start Time: 1145  Stop Time: 1240  Total time in clinic (min): 55 minutes    Assessment  Assessment details: Kandis Martin is a pleasant 53 y.o. female presents with vertigo and dizziness symptoms that started approximately 2-3 weeks ago. Triggering events include: laying flat; looking up; and bending over. Her symptoms last for less than a minute when they are triggered followed by a few hours of a sense of imbalance. Due to this, she occasionally feels off-balance while walking.    PT examination findings include: Dizziness Handicap Index score of 48/100 indicating moderate self-perceived impairment; minor symptoms with cervical extension and flexion; and apogeotropic horizontal nystagmus in bilateral roll test with more prominent nystagmus on R side. These findings are consistent with L horizontal semi-circular canal benign paroxysmal positional vertigo.     L Casani maneuver was performed once and tolerated fair with nausea symptoms produced following. She was instructed to try to avoid laying flat and bending down today until she goes to bed today. She verbalized understanding. The patient would benefit from skilled physical therapy to provide canalith repositioning, exercises, neuromuscular reeducation, manual techniques, gait training, and modalities as deemed necessary in order to help her achieve her goals and decrease her dizziness symptoms.      Impairments: activity intolerance, impaired balance and lacks appropriate home exercise program  Other impairment: dizziness    Goals  STGs in 4 weeks  1. Patient will be independent with HEP (for VOR, movement deficits, and/or balance as needed).  2. Patient will  be able to perform all cervical ROM without provocation of symptoms.  3. Balance testing will be completed prn.    LTGs in 8 weeks  1. Patient will not have any symptom provocation during any BPPV testing.  2. Patient will improve score on Dizziness Handicap Index by at least 17% for decreased perception of disability (17% is minimal detectable change).  3. Patient will be able to complete all bed mobility and household tasks without provocation of symptoms for improved QOL.          Plan  Patient would benefit from: PT eval and skilled physical therapy  Planned therapy interventions: balance, neuromuscular re-education, canalith repositioning, patient education, postural training, strengthening, stretching, therapeutic activities, therapeutic exercise, graded activity, graded exercise and home exercise program  Frequency: 2x week  Duration in weeks: 8  Plan of Care beginning date: 4/3/2024  Plan of Care expiration date: 6/3/2024  Treatment plan discussed with: patient and family      Subjective Evaluation    History of Present Illness  Mechanism of injury: Kandis states that she started to experience dizziness and vertigo symptoms about 2-3 weeks. She describes it as an episode of vertigo for doing the tiniest movement. Then she stays dizzy or walk in a straight line for about 2 hours and then it goes it away. There has been an episode of this every day since it started. She does have an history of this flaring up usually in the spring. The vertigo is a few seconds and feels that the whole world is tilting sideways. Denies any meclizine prescription. Triggering events include: laying flat, usually tilting her head to the L and once or twice when going to the R, and tilting her head back to drink water. Their niece who is a PT and looked into BPPV but didn't do an epley as she said that she didn't see anything in her eyes.     She does have a history of vestibular migraine and has been on medications for the past  13-14 years. At this time, she does not feel that her current symptoms. When she gets a vestibular migraine, they are constant.     She has seen ENT and neurology in the past but not recently.     She reports 1 fall over the past year off her horse. Did not hit her head then but hurt her back.     She did start hormone replacement therapy about 3-4 months ago.     She notes that she had one episode of symptoms and then following it, she felt that her head was moving with every foot step.  Patient Goals  Patient goal: not be dizzy  Pain  Current pain ratin  Location: lower back          Objective    Dysequilibrium: Yes  Lightheadedness: No  Vertigo: Yes  Rocking or Swaying: Yes         Oscillopsia: No  Diplopia: No  Motion sickness: Yes--chronic    Exacerbation Factors:  Bending over: Sometimes  Turning Head: Sometimes  Rolling in bed: Yes  Walking: No  Looking up: Yes  Supine to/from sitting: Sometimes  Optokinetic movement: Yes--triggers more migraine type symptoms    Duration of Symptoms: vertigo a few seconds, ongoing imbalance for a few hrs after     Concurrent Complaints:  Tinnitus:Yes--chronic  Aural Fullness:Sometimes  Known hearing loss:No  Nausea, Vomiting: Sometimes  Altered Vision: No  Peripheral Neuropathy:No  Cervical Pain: Yes   Headache: Yes (migraines and vestibular migraines)      PHYSICAL FINDINGS:  Oculomotor ROM : WNL  Resting nystagmus: No  Gaze holding nystagmus No   Smooth pursuit Normal    Vertical Saccades:Normal  Horizontal Saccades:Normal  Convergence: Normal    Head thrust (room light): Normal      DHI: score 48/100 = moderate self perceived disability  (0-30 mild , 30-60 moderate,  severe disability)      Positional testing: Left Right   [place] Pinzon pike  Negative for symptoms and nystagmus  Negative for symptoms and nystagmus   Roll test:  Brief and very subtle apogeotrophic nystagmus  Longer and apogeotropic nystagmus     [place] & lean: no observed nystagmus    Cervical  ROM:  Flexion: WNL--mild sxs  Extension: WNL--mild sxs  Right rotation: WNL no sxs  Left rotation: WNL no sxs  R lateral flexion:  limited 25% no sxs  L lateral flexion: limited 25% no sxs              Short Term Goal Expiration Date:(5/3/2024)  Long Term Goal Expiration Date: (6/3/2024)  POC Expiration Date: (6/3/2024)      POC expires Unit limit Auth Expiration date PT/OT/ST + Visit Limit?   6/3/24 BOMN N/a BOMN                             Visit/Unit Tracking  AUTH Status:  Date 4/3             BOMN Used 1 IE              To PN  Of 10                   Precautions dizziness       Manuals 4/3                       Pt education Various causes of dizziness; exam findings; post maneuver precautions               Neuro Re-Ed         ANTOINETTE dukes X1 rd                                                       Ther Ex                                                                        Ther Activity                        Gait Training                        Modalities

## 2024-04-03 NOTE — LETTER
April 3, 2024    [place]   [facility] [address]    Patient: Kandis Martin   YOB: 1970   Date of Visit: 4/3/2024     Encounter Diagnosis     ICD-10-CM    1. Dizziness and giddiness  R42       2. BPPV (benign paroxysmal positional vertigo), left  H81.12           Dear Dr. Goodrich:    Thank you for your recent referral of Kandis Martin. Please review the attached evaluation summary from Kandis's recent visit.     Please verify that you agree with the plan of care by signing the attached order.     If you have any questions or concerns, please do not hesitate to call.     I sincerely appreciate the opportunity to share in the care of one of your patients and hope to have another opportunity to work with you in the near future.       Sincerely,    Jazmin Dc, PT      Referring Provider:      I certify that I have read the below Plan of Care and certify the need for these services furnished under this plan of treatment while under my care.                    [place]  [address] [facility] [address]  Via Fax: [phone]          PT Evaluation     Today's date: 4/3/2024  Patient name: Kandis Martin  : 1970  MRN: [id number]  Referring provider: Stephanie Goodrich  Dx:   Encounter Diagnosis     ICD-10-CM    1. Dizziness and giddiness  R42       2. BPPV (benign paroxysmal positional vertigo), left  H81.12           Start Time: 1145  Stop Time: 1240  Total time in clinic (min): 55 minutes    Assessment  Assessment details: Kandis Martin is a pleasant 53 y.o. female presents with vertigo and dizziness symptoms that started approximately 2-3 weeks ago. Triggering events include: laying flat; looking up; and bending over. Her symptoms last for less than a minute when they are triggered followed by a few hours of a sense of imbalance. Due to this, she occasionally feels off-balance while walking.    PT examination findings include: Dizziness  Handicap Index score of 48/100 indicating moderate self-perceived impairment; minor symptoms with cervical extension and flexion; and apogeotropic horizontal nystagmus in bilateral roll test with more prominent nystagmus on R side. These findings are consistent with L horizontal semi-circular canal benign paroxysmal positional vertigo.     L Casani maneuver was performed once and tolerated fair with nausea symptoms produced following. She was instructed to try to avoid laying flat and bending down today until she goes to bed today. She verbalized understanding. The patient would benefit from skilled physical therapy to provide canalith repositioning, exercises, neuromuscular reeducation, manual techniques, gait training, and modalities as deemed necessary in order to help her achieve her goals and decrease her dizziness symptoms.      Impairments: activity intolerance, impaired balance and lacks appropriate home exercise program  Other impairment: dizziness    Goals  STGs in 4 weeks  1. Patient will be independent with HEP (for VOR, movement deficits, and/or balance as needed).  2. Patient will be able to perform all cervical ROM without provocation of symptoms.  3. Balance testing will be completed prn.    LTGs in 8 weeks  1. Patient will not have any symptom provocation during any BPPV testing.  2. Patient will improve score on Dizziness Handicap Index by at least 17% for decreased perception of disability (17% is minimal detectable change).  3. Patient will be able to complete all bed mobility and household tasks without provocation of symptoms for improved QOL.          Plan  Patient would benefit from: PT eval and skilled physical therapy  Planned therapy interventions: balance, neuromuscular re-education, canalith repositioning, patient education, postural training, strengthening, stretching, therapeutic activities, therapeutic exercise, graded activity, graded exercise and home exercise program  Frequency: 2x  week  Duration in weeks: 8  Plan of Care beginning date: 4/3/2024  Plan of Care expiration date: 6/3/2024  Treatment plan discussed with: patient and family      Subjective Evaluation    History of Present Illness  Mechanism of injury: Kandis states that she started to experience dizziness and vertigo symptoms about 2-3 weeks. She describes it as an episode of vertigo for doing the tiniest movement. Then she stays dizzy or walk in a straight line for about 2 hours and then it goes it away. There has been an episode of this every day since it started. She does have an history of this flaring up usually in the spring. The vertigo is a few seconds and feels that the whole world is tilting sideways. Denies any meclizine prescription. Triggering events include: laying flat, usually tilting her head to the L and once or twice when going to the R, and tilting her head back to drink water. Their niece who is a PT and looked into BPPV but didn't do an epley as she said that she didn't see anything in her eyes.     She does have a history of vestibular migraine and has been on medications for the past 13-14 years. At this time, she does not feel that her current symptoms. When she gets a vestibular migraine, they are constant.     She has seen ENT and neurology in the past but not recently.     She reports 1 fall over the past year off her horse. Did not hit her head then but hurt her back.     She did start hormone replacement therapy about 3-4 months ago.     She notes that she had one episode of symptoms and then following it, she felt that her head was moving with every foot step.  Patient Goals  Patient goal: not be dizzy  Pain  Current pain ratin  Location: lower back          Objective    Dysequilibrium: Yes  Lightheadedness: No  Vertigo: Yes  Rocking or Swaying: Yes         Oscillopsia: No  Diplopia: No  Motion sickness: Yes--chronic    Exacerbation Factors:  Bending over: Sometimes  Turning Head:  Sometimes  Rolling in bed: Yes  Walking: No  Looking up: Yes  Supine to/from sitting: Sometimes  Optokinetic movement: Yes--triggers more migraine type symptoms    Duration of Symptoms: vertigo a few seconds, ongoing imbalance for a few hrs after     Concurrent Complaints:  Tinnitus:Yes--chronic  Aural Fullness:Sometimes  Known hearing loss:No  Nausea, Vomiting: Sometimes  Altered Vision: No  Peripheral Neuropathy:No  Cervical Pain: Yes   Headache: Yes (migraines and vestibular migraines)      PHYSICAL FINDINGS:  Oculomotor ROM : WNL  Resting nystagmus: No  Gaze holding nystagmus No   Smooth pursuit Normal    Vertical Saccades:Normal  Horizontal Saccades:Normal  Convergence: Normal    Head thrust (room light): Normal      DHI: score 48/100 = moderate self perceived disability  (0-30 mild , 30-60 moderate,  severe disability)      Positional testing: Left Right   Kumar Pinzon pike  Negative for symptoms and nystagmus  Negative for symptoms and nystagmus   Roll test:  Brief and very subtle apogeotrophic nystagmus  Longer and apogeotropic nystagmus     Seney & lean: no observed nystagmus    Cervical ROM:  Flexion: WNL--mild sxs  Extension: WNL--mild sxs  Right rotation: WNL no sxs  Left rotation: WNL no sxs  R lateral flexion:  limited 25% no sxs  L lateral flexion: limited 25% no sxs              Short Term Goal Expiration Date:(5/3/2024)  Long Term Goal Expiration Date: (6/3/2024)  POC Expiration Date: (6/3/2024)      POC expires Unit limit Auth Expiration date PT/OT/ST + Visit Limit?   6/3/24 BOMN N/a BOMN                             Visit/Unit Tracking  AUTH Status:  Date 4/3             BOMN Used 1 IE              To PN  Of 10                   Precautions dizziness       Manuals 4/3                       Pt education Various causes of dizziness; exam findings; post maneuver precautions               Neuro Re-Ed         L ernst X1 rd                                                       Ther Ex                                                                         Ther Activity                        Gait Training                        Modalities

## 2024-04-05 ENCOUNTER — OFFICE VISIT (OUTPATIENT)
Dept: PHYSICAL THERAPY | Facility: CLINIC | Age: 54
End: 2024-04-05
Payer: COMMERCIAL

## 2024-04-05 DIAGNOSIS — R42 DIZZINESS AND GIDDINESS: ICD-10-CM

## 2024-04-05 DIAGNOSIS — H81.12 BPPV (BENIGN PAROXYSMAL POSITIONAL VERTIGO), LEFT: Primary | ICD-10-CM

## 2024-04-05 PROCEDURE — 97112 NEUROMUSCULAR REEDUCATION: CPT

## 2024-04-05 PROCEDURE — 95992 CANALITH REPOSITIONING PROC: CPT

## 2024-04-05 NOTE — PROGRESS NOTES
Daily Note     Today's date: 2024  Patient name: Kandis Martin  : 1970  MRN: [id number]  Referring provider: Stephanie Goodrich  Dx:   Encounter Diagnosis     ICD-10-CM    1. BPPV (benign paroxysmal positional vertigo), left  H81.12       2. Dizziness and giddiness  R42           Start Time: 0806  Stop Time: 0836  Total time in clinic (min): 30 minutes    Subjective: Kandis states that she has not experienced vertigo symptoms yesterday. She was able to ride her horse for 30 mins yesterday without any issues or symptoms.      Objective: See treatment diary below    R Kumar-Hallpike: negative for symptoms and nystagmus  L [place]-Hallpike: negative for symptoms and nystagmus  R Roll test: minor symptoms with very brief and subtle apogeotrophic nystagmus  L Roll test: minor symptoms, no nystagmus      Assessment: Kandis had a favorable response to L Casani on IE with no subjective symptoms over the past day. All BPPV positional testing was reassessed. Bilateral Charlotte-hallpikes remain negative. She did have minor symptoms upon flexing her neck to complete roll test with more symptoms to R roll test with very brief and subtle apogeotrophic nystagmus observed. No nystagmus in L roll test with minor symptoms. Given good response to L Casani last session, this maneuver was performed three times with minor symptoms of dizziness and feeling off upon returning to sitting. These symptoms eased with repetition. She would benefit from continued skilled PT to address any remaining dizziness symptoms.       Plan: Continue per plan of care.  Progress treatment as tolerated.       Short Term Goal Expiration Date:(5/3/2024)  Long Term Goal Expiration Date: (6/3/2024)  POC Expiration Date: (6/3/2024)      POC expires Unit limit Auth Expiration date PT/OT/ST + Visit Limit?   6/3/24 BOMN N/a BOMN                             Visit/Unit Tracking  AUTH Status:  Date 4/3 4/5            BOMN Used 1 IE 2             To PN  Of 10 Of  10                  Precautions dizziness       Manuals 4/3 4/5                      Pt education Various causes of dizziness; exam findings; post maneuver precautions               Neuro Re-Ed         L ernst X1 rd X3 rds                                      BPPV positional testing  See above              Ther Ex                                                                        Ther Activity                        Gait Training                        Modalities

## 2024-04-08 ENCOUNTER — OFFICE VISIT (OUTPATIENT)
Dept: PHYSICAL THERAPY | Facility: CLINIC | Age: 54
End: 2024-04-08
Payer: COMMERCIAL

## 2024-04-08 DIAGNOSIS — R42 DIZZINESS AND GIDDINESS: ICD-10-CM

## 2024-04-08 DIAGNOSIS — H81.12 BPPV (BENIGN PAROXYSMAL POSITIONAL VERTIGO), LEFT: Primary | ICD-10-CM

## 2024-04-08 PROCEDURE — 97112 NEUROMUSCULAR REEDUCATION: CPT

## 2024-04-08 NOTE — PROGRESS NOTES
Daily Note     Today's date: 2024  Patient name: Kandis Martin  : 1970  MRN: [id number]  Referring provider: Stephanie Goodrich  Dx:   Encounter Diagnosis     ICD-10-CM    1. BPPV (benign paroxysmal positional vertigo), left  H81.12       2. Dizziness and giddiness  R42                        Subjective: Kandis states that she felt a bit off after last session. Since then she has felt pretty good. Very minor symptoms from time to time with either head movement or laying down. Not a full vertigo sensation. She was able to go horseback riding with no issues.       Objective: See treatment diary below    R [place]-Hallpike: negative for symptoms and nystagmus  L Kumar-Hallpike: negative for symptoms and nystagmus  R Roll test: negative for symptoms and nystagmus  L Roll test: negative for symptoms and nystagmus      Assessment: Kandis has responded favorably to canalith repositioning with only very minor and rare dizziness symptoms remaining with head movements. At this point, all BPPV positional testing was negative for symptoms and nystagmus. This supports resolution of BPPV bout with likely motion sensitivity remaining due to avoidance of certain movements over the past month or so with her symptoms. She was encouraged to not limit her movement and repeat any movement that brings on minor symptoms. Her DHI significantly improved from 48/100 on IE to 8/100 today. Due to high recurrence rate of BPPV, she will be placed on a 30 day hold in case symptoms return. If they do, she is to call to get rescheduled. If no contact from her in that time frame, she will be discharged. She verbalized understanding and agreement with this plan.      Plan: PT on hold for 30 days in case symptoms return     Short Term Goal Expiration Date:(5/3/2024)  Long Term Goal Expiration Date: (6/3/2024)  POC Expiration Date: (6/3/2024)      POC expires Unit limit Auth Expiration date PT/OT/ST + Visit Limit?   6/3/24 BOMN N/a BOMN                              Visit/Unit Tracking  AUTH Status:  Date 4/3 4/5 4/8           BOMN Used 1 IE 2 3            To PN  Of 10 Of 10 Of 10                 Precautions dizziness       Manuals 4/3 4/5 4/8                     Pt education Various causes of dizziness; exam findings; post maneuver precautions  Motion sensitivity following--continue to perform all movements and repeat as needed. 30 day hold             Neuro Re-Ed         L ernst X1 rd X3 rds                                      BPPV positional testing  See above See above             Ther Ex                                                                        Ther Activity                        Gait Training                        Modalities

## 2024-04-10 ENCOUNTER — APPOINTMENT (OUTPATIENT)
Dept: PHYSICAL THERAPY | Facility: CLINIC | Age: 54
End: 2024-04-10
Payer: COMMERCIAL

## 2024-04-17 DIAGNOSIS — I42.1 HOCM (HYPERTROPHIC OBSTRUCTIVE CARDIOMYOPATHY) (HCC): ICD-10-CM

## 2024-04-18 RX ORDER — DILTIAZEM HYDROCHLORIDE 180 MG/1
180 CAPSULE, COATED, EXTENDED RELEASE ORAL DAILY
Qty: 90 CAPSULE | Refills: 1 | Status: SHIPPED | OUTPATIENT
Start: 2024-04-18

## 2024-04-29 ENCOUNTER — OFFICE VISIT (OUTPATIENT)
Dept: PHYSICAL THERAPY | Facility: CLINIC | Age: 54
End: 2024-04-29
Payer: COMMERCIAL

## 2024-04-29 DIAGNOSIS — R42 DIZZINESS AND GIDDINESS: ICD-10-CM

## 2024-04-29 DIAGNOSIS — H81.12 BPPV (BENIGN PAROXYSMAL POSITIONAL VERTIGO), LEFT: Primary | ICD-10-CM

## 2024-04-29 PROCEDURE — 97112 NEUROMUSCULAR REEDUCATION: CPT

## 2024-04-29 NOTE — PROGRESS NOTES
PT Progress Note     Today's date: 2024  Patient name: Kandis Martin  : 1970  MRN: [id number]  Referring provider: Stephanie Goodrich  Dx:   Encounter Diagnosis     ICD-10-CM    1. BPPV (benign paroxysmal positional vertigo), left  H81.12       2. Dizziness and giddiness  R42                      Subjective: Kandis states that she was in a pilates class on Saturday, and doing some stretching and repetitive bending over. The floor and ceiling flipped places, dizzy and very nauseous. She felt very unsteady. The vertigo symptoms lasted a few minutes with ongoing dizziness and feeling off that lasted for a few hrs. It then went away and she could go horseback riding. Woke up the following day with the room spinning again. Symptom intensity today is about 1-2/10, yesterday it was more debilitating.     Patient Goals  Patient goal: not be dizzy    Pain  Current pain ratin  Location: lower back      Objective: See treatment diary below       PHYSICAL FINDINGS:  Oculomotor ROM : WNL; PN WNL  Resting nystagmus: No; PN No  Gaze holding nystagmus No; PN No  Smooth pursuit Normal; PN Normal     Vertical Saccades:Normal; PN Normal  Horizontal Saccades:Normal; PN Normal  Convergence: Normal; PN Normal     Head thrust (room light): Normal; PN Normal     DVA: adequate (2 lines difference)     MCTSIB:  FT EO, firm: 30 sec no sway  FT EC, firm: 30 sec no sway  FT EO, foam: 30 sec no sway  FT EC, foam: 30 sec minor sway        Positional testing: Left Right   Kumar [place] p[place]  Negative for symptoms and nystagmus; PN Negative for symptoms and nystagmus  Negative for symptoms and nystagmus; PN Negative for symptoms and nystagmus   Roll test:  Brief and very subtle apogeotrophic nystagmus; PN Negative for symptoms and nystagmus  Longer and apogeotropic nystagmus; PN Negative for symptoms and nystagmus           Cervical ROM:  Flexion: WNL--mild sxs; PN WNL--no change to sxs  Extension: WNL--mild sxs; PN WNL--no change  to sxs  Right rotation: WNL no sxs; PN WNL--no change to sxs  Left rotation: WNL no sxs; PN WNL--no change to sxs  R lateral flexion:  limited 25% no sxs; PN WNL--no change to sxs  L lateral flexion: limited 25% no sxs; PN WNL--no change to sxs      Assessment: Kandis returns to PT after being placed on a 30 day hold on 4/8/2024 with the chief complaint of recurrence of dizziness on Saturday 4/27. It has since improved and is barely impacting her today. All oculomotor and vestibular testing was normal with no saccadic eye movements, nystagmus, or symptoms provoked. BPPV testing for all 3 semi-circular canals bilaterally were negative for any symptoms or nystagmus. Sensory integration for balance testing via MCTSIB was normal. Other causes of dizziness such as dehydration, stress, blood sugars were discussed. She was encouraged to stay hydrated and monitor stress levels over the next few days. Due to sporadic symptoms, plan for another check in later this week to ensure exam remains clear of all symptoms. She verbalized understanding and is in agreement with this as she is anxious about her symptoms returning again.      Goals  STGs in 4 weeks  1. Patient will be independent with HEP (for VOR, movement deficits, and/or balance as needed). - MET  2. Patient will be able to perform all cervical ROM without provocation of symptoms. - MET  3. Balance testing will be completed prn. - MET     LTGs in 8 weeks  1. Patient will not have any symptom provocation during any BPPV testing.  2. Patient will improve score on Dizziness Handicap Index by at least 17% for decreased perception of disability (17% is minimal detectable change).  3. Patient will be able to complete all bed mobility and household tasks without provocation of symptoms for improved QOL.      Plan: Continue per plan of care.     Planned therapy interventions: balance, neuromuscular re-education, canalith repositioning, patient education, postural training,  strengthening, stretching, therapeutic activities, therapeutic exercise, graded activity, graded exercise and home exercise program    Frequency: 2x week  Duration in weeks: 4    Plan of Care beginning date: 4/3/2024  Plan of Care expiration date: 6/3/2024     Short Term Goal Expiration Date:(5/3/2024)  Long Term Goal Expiration Date: (6/3/2024)  POC Expiration Date: (6/3/2024)      POC expires Unit limit Auth Expiration date PT/OT/ST + Visit Limit?   6/3/24 BOMN N/a BOMN                             Visit/Unit Tracking  AUTH Status:  Date 4/3 4/5 4/8 4/29          BOMN Used 1 IE 2 3 4 PN           To PN  Of 10 Of 10 Of 10 Of 10                Precautions dizziness       Manuals 4/3 4/5 4/8 4/29                    Pt education Various causes of dizziness; exam findings; post maneuver precautions  Motion sensitivity following--continue to perform all movements and repeat as needed. 30 day hold Exam findings--other causes of dizziness such as dehydration, stress            Neuro Re-Ed         ANTOINETTE dukes X1 rd X3 rds                                      BPPV positional testing  See above See above             Ther Ex                                                                        Ther Activity                        Gait Training                        Modalities

## 2024-05-03 ENCOUNTER — OFFICE VISIT (OUTPATIENT)
Dept: PHYSICAL THERAPY | Facility: CLINIC | Age: 54
End: 2024-05-03
Payer: COMMERCIAL

## 2024-05-03 DIAGNOSIS — R42 DIZZINESS AND GIDDINESS: ICD-10-CM

## 2024-05-03 DIAGNOSIS — H81.12 BPPV (BENIGN PAROXYSMAL POSITIONAL VERTIGO), LEFT: Primary | ICD-10-CM

## 2024-05-03 PROCEDURE — 97112 NEUROMUSCULAR REEDUCATION: CPT

## 2024-05-03 NOTE — PROGRESS NOTES
Daily Note     Today's date: 5/3/2024  Patient name: Kandis Martin  : 1970  MRN: [id number]  Referring provider: Stephanie Goodrich  Dx:   Encounter Diagnosis     ICD-10-CM    1. BPPV (benign paroxysmal positional vertigo), left  H81.12       2. Dizziness and giddiness  R42                      Subjective: Kandis states that she feel good since Wednesday. She has challenged herself with general movement and horseback riding with no issues.       Objective: See treatment diary below    R Wyatt-hallpike: negative for symptoms and nystagmus  L Wyatt-hallpike: negative for symptoms and nystagmus  R Roll test: negative for symptoms and nystagmus  L Roll test: negative for symptoms and nystagmus      Assessment: Kandis has had no recurrence of dizziness symptoms since her last session on Wednesday. Able to replicate clear BPPV examination today for all canals bilaterally. This helped reduce her anxiety associated with these positions. Continued to discuss possibility that her BPPV had recurred with self-resolution or dizziness caused by other factors. She will work on monitoring water intake and stress levels. She will be placed on a 30 day hold in case her symptoms return. She verbalized understanding.      Plan: PT on 30 day hold     Short Term Goal Expiration Date:(5/3/2024)  Long Term Goal Expiration Date: (6/3/2024)  POC Expiration Date: (6/3/2024)      POC expires Unit limit Auth Expiration date PT/OT/ST + Visit Limit?   6/3/24 BOMN N/a BOMN                             Visit/Unit Tracking  AUTH Status:  Date 4/3 4/5 4/8 4/29 5/3         BOMN Used 1 IE 2 3 4 PN 5          To PN  Of 10 Of 10 Of 10 Of 10 1 of 10               Precautions dizziness       Manuals 4/3 4/5 4/8 4/29 5/3                   Pt education Various causes of dizziness; exam findings; post maneuver precautions  Motion sensitivity following--continue to perform all movements and repeat as needed. 30 day hold Exam findings--other causes of  dizziness such as dehydration, stress            Neuro Re-Ed         L ernst X1 rd X3 rds                              BPPV positional testing  See above See above  See above           Ther Ex                                                                        Ther Activity                        Gait Training                        Modalities

## 2024-05-05 ENCOUNTER — OFFICE VISIT (OUTPATIENT)
Dept: URGENT CARE | Age: 54
End: 2024-05-05
Payer: COMMERCIAL

## 2024-05-05 VITALS
OXYGEN SATURATION: 100 % | DIASTOLIC BLOOD PRESSURE: 67 MMHG | RESPIRATION RATE: 20 BRPM | TEMPERATURE: 98 F | WEIGHT: 174 LBS | HEIGHT: 69 IN | SYSTOLIC BLOOD PRESSURE: 116 MMHG | BODY MASS INDEX: 25.77 KG/M2 | HEART RATE: 84 BPM

## 2024-05-05 DIAGNOSIS — H10.32 ACUTE BACTERIAL CONJUNCTIVITIS OF LEFT EYE: ICD-10-CM

## 2024-05-05 DIAGNOSIS — H00.015 HORDEOLUM EXTERNUM OF LEFT LOWER EYELID: Primary | ICD-10-CM

## 2024-05-05 PROCEDURE — G0382 LEV 3 HOSP TYPE B ED VISIT: HCPCS | Performed by: FAMILY MEDICINE

## 2024-05-05 PROCEDURE — S9083 URGENT CARE CENTER GLOBAL: HCPCS | Performed by: FAMILY MEDICINE

## 2024-05-05 RX ORDER — TOBRAMYCIN AND DEXAMETHASONE 3; 1 MG/ML; MG/ML
1 SUSPENSION/ DROPS OPHTHALMIC
Qty: 5 ML | Refills: 0 | Status: SHIPPED | OUTPATIENT
Start: 2024-05-05

## 2024-05-05 NOTE — PROGRESS NOTES
[facility]        NAME: Kandis Martin is a 53 y.o. female  : 1970    MRN: [id number]  DATE: May 5, 2024  TIME: 5:53 PM    Assessment and Plan   Hordeolum externum of left lower eyelid [H00.015]  1. Hordeolum externum of left lower eyelid  tobramycin-dexamethasone (TOBRADEX) ophthalmic suspension      2. Acute bacterial conjunctivitis of left eye  tobramycin-dexamethasone (TOBRADEX) ophthalmic suspension        Cool compress to the left eye for swelling      Patient Instructions       Follow up with PCP in 3-5 days.  Proceed to  ER if symptoms worsen.    If tests have been performed at [facility], our office will contact you with results if changes need to be made to the care plan discussed with you at the visit.  You can review your full results on [facility].    Chief Complaint     Chief Complaint   Patient presents with   • Eye Problem     Pt has what appears to be a stye in her left eye. Area is swollen, red. 6/10 pain.      History of Present Illness   HPI  Kandis Martin is a 53 y.o. female  who presented to [facility] today with 3 days h/o of left lower eye lid swelling and redness.  Pt states started as a little bump and then increased in size. She has been trying warm compress and took one Amoxicillin today.  Pt has a job interview in two days and is concerned regarding this swelling and redness.    Review of Systems   Review of Systems   Constitutional:  Negative for chills and fever.   HENT:  Negative for congestion, rhinorrhea and sore throat.    Eyes:  Positive for pain and redness. Negative for visual disturbance.   Cardiovascular:  Negative for chest pain.   Skin:  Negative for rash.   Neurological:  Negative for dizziness and headaches.         Current Medications       Current Outpatient Medications:   •  tobramycin-dexamethasone (TOBRADEX) ophthalmic suspension, Administer 1 drop into the left eye every 4 (four) hours while awake, Disp: 5 mL, Rfl: 0  •   Botox 200 units SOLR, , Disp: , Rfl:   •  buPROPion (WELLBUTRIN XL) 300 mg 24 hr tablet, Take 300 mg by mouth Takes 375 mg once daily., Disp: , Rfl:   •  butalbital-acetaminophen-caffeine (FIORICET,ESGIC) -40 mg per tablet, as needed Prn, Disp: , Rfl:   •  cetirizine (ZyrTEC) 5 MG tablet, Take 5 mg by mouth daily, Disp: , Rfl:   •  Cyanocobalamin (VITAMIN B 12 PO), Take by mouth daily (Patient not taking: Reported on 10/29/2021), Disp: , Rfl:   •  diltiazem (CARDIZEM CD) 180 mg 24 hr capsule, TAKE 1 CAPSULE BY MOUTH EVERY DAY, Disp: 90 capsule, Rfl: 1  •  DULoxetine (CYMBALTA) 60 mg delayed release capsule, Take 20 mg by mouth daily, Disp: , Rfl:   •  estradiol (VIVELLE-DOT) 0.1 MG/24HR, APPLY 1 PATCH TRANSDERMALLY TWICE A WEEK, Disp: , Rfl:   •  HYDROcodone-acetaminophen (NORCO) 5-325 mg per tablet, , Disp: , Rfl:   •  HYDROcodone-acetaminophen (ZOLVIT)  mg/15 mL solution, Take 1 tablet by mouth as needed  (Patient not taking: Reported on 10/29/2021), Disp: , Rfl:   •  hydrocortisone 2.5 % lotion, Apply 2.5 % topically as needed , Disp: , Rfl:   •  metoprolol succinate (TOPROL-XL) 25 mg 24 hr tablet, TAKE 1/2 TABLET (12.5 MG TOTAL) BY MOUTH ONCE DAILY, Disp: 45 tablet, Rfl: 4  •  Omeprazole 20 MG TBEC, as needed , Disp: , Rfl:   •  Progesterone 100 MG CAPS, , Disp: , Rfl:   •  Pseudoephedrine-Ibuprofen (Advil Cold/Sinus)  MG TABS, Take by mouth, Disp: , Rfl:   •  sodium picosulfate, magnesium oxide, citric acid (Clenpiq) 10-3.5-12 MG-GM -GM/160ML SOLN, Take 1 kit by mouth see administration instructions Follow instructions given at office, Disp: 160 mL, Rfl: 0    Current Allergies     Allergies as of 05/05/2024 - Reviewed 05/05/2024   Allergen Reaction Noted   • Banana - food allergy  07/17/2020   • Ciprofloxacin  08/14/2015   • Sulfamethoxazole-trimethoprim  08/14/2015            The following portions of the patient's history were reviewed and updated as appropriate: allergies, current  "medications, past family history, past medical history, past social history, past surgical history and problem list.     Past Medical History:   Diagnosis Date   • HOCM (hypertrophic obstructive cardiomyopathy) (HCC)    • Migraines    • QT prolongation        Past Surgical History:   Procedure Laterality Date   • UMBILICAL HERNIA REPAIR      1997       Family History   Problem Relation Age of Onset   • Breast cancer Mother    • No Known Problems Father    • Diabetes type II Maternal Grandmother    • Colon polyps Maternal Grandfather    • Alzheimer's disease Paternal Grandmother          Medications have been verified.        Objective   /67   Pulse 84   Temp 98 °F (36.7 °C)   Resp 20   Ht 5' 9\" (1.753 m)   Wt 78.9 kg (174 lb)   SpO2 100%   BMI 25.70 kg/m²   No LMP recorded. Patient is postmenopausal.       Physical Exam     Physical Exam  Vitals and nursing note reviewed.   Constitutional:       Appearance: She is well-developed.   HENT:      Head: Normocephalic and atraumatic.      Right Ear: External ear normal.      Left Ear: External ear normal.   Eyes:      Extraocular Movements: Extraocular movements intact.      Conjunctiva/sclera:      Left eye: Left conjunctiva is injected.        Comments: Left lower eye lid: + stye with multiple yellow pinpoint papules on margin and inner lid  + inferior orbital swelling and mild erythema   Cardiovascular:      Rate and Rhythm: Normal rate.   Pulmonary:      Effort: Pulmonary effort is normal. No respiratory distress.   Neurological:      Mental Status: She is alert and oriented to person, place, and time.   Psychiatric:         Mood and Affect: Mood normal.         Behavior: Behavior normal.                   "

## 2024-07-29 ENCOUNTER — HOSPITAL ENCOUNTER (OUTPATIENT)
Dept: RADIOLOGY | Age: 54
Discharge: HOME | End: 2024-07-29
Payer: COMMERCIAL

## 2024-07-29 DIAGNOSIS — R19.09 OTHER INTRA-ABDOMINAL AND PELVIC SWELLING, MASS AND LUMP: ICD-10-CM

## 2024-07-29 PROCEDURE — 76856 US EXAM PELVIC COMPLETE: CPT

## 2024-07-29 PROCEDURE — 76830 TRANSVAGINAL US NON-OB: CPT

## 2024-09-16 NOTE — PROGRESS NOTES
HCM Clinic Follow-up Visit - Cardiology   Kandis Martin 54 y.o. female MRN: [id number]  Unit/Bed#:  Encounter: [id number]    Patient Active Problem List    Diagnosis Date Noted    Facet arthropathy, lumbar 02/01/2022    QT prolongation 12/12/2021    HOCM (hypertrophic obstructive cardiomyopathy) (HCC) 12/12/2021    Syncope and collapse 12/12/2021    Depression, unspecified 12/12/2021    Abnormal thyroid function test 10/29/2021    H/O hyperthyroidism 10/29/2021     Plan: Ms Martin continues to have low back pain that is managed by visiting a chiropractor. The back pain has been somewhat limiting but she does continue to stay active by riding her horses, riding her stationary bike, attending [employer] classes and walking up to 8 miles a day when she is in [place]. Throughout these activities she has not experienced dyspnea, chest pain, palpitations, lower extremity swelling, or syncope except when it is hot and humid when she would experience shortness of breath and decreased exercise tolerance. She has history of dizziness due to vestibular vertigo but that has been quite infrequent. She has had COVID earlier this year and she was encouraged to obtain the new vaccine. Her BP is consistently controlled on diltiazem and metoprolol and she has no murmur on cardiac auscultation today. She does follow a low salt heart healthy diet and her weight has been steady with a BMI of 24.96 kg/m². She has not found a job yet and is back to school to obtain a masters degree in industrial and institutional psychology. She will soon be participating in her last horse racing activity in [place] after which she would retire from such activities. She has brought in a copy of her laboratory tests performed in August of 2023 elsewhere with her. CBC, CMP, TSH, Vit D level, folate, B12 and urinalysis are normal. Hemoglobin A1C is 6% and her LDL is 134. She will have new blood work next week and she will forward a copy to us when  available.      Physician Requesting Consult: Dr Blanca Cardenas  Reason for Consult / Principal Problem: HOCM     HPI: Ms Kandis Martin is a 52 year old female with history of hyperthyroidism, vestibular migraines and recent diagnosis of hypertrophic cardiomyopathy was referred to HCM Clinic for further management. Initial cardiac workup was initiated for shortness of breath and chest pain and included EKG which showed biatrial enlargement. That led to TTE in  (in  [place], [place] ) which was significant for mild concentric left ventricular hypertrophy, and severe left atrial enlargement.  She followed with [facility]'s Cardiology and had a repeat TTE on 10/01/2021 which showed normal left ventricular systolic function (LVEF 65% ), reduced global longitudinal strain (-14.2%) with severe regional reduction in the basal -mid anteroseptal wall, no regional wall motion abnormalities, severe asymmetric hypertrophy of septum (2 cm), mildly increased right ventricle, mild systolic anterior motion of anterior leaflet of mitral valve, trace mitral valve and tricuspid valve regurgitations.  Cardiac MRI from 10/25/2021 revealed findings consistent with hypertrophic cardiomyopathy, such as increased myocardial wall thickness at mid anteroseptal and inferoseptal walls with subtle hazy delayed mid myocardial enhancement throughout the mid septum,  mild systolic motion of chordae of the anterior mitral valve leaflet, as well as mild left atrial enlargement.  Extended ambulatory heart monitoring (12 days 18 hours) from  was mostly benign with 1 run of supraventricular tachycardia (4 beats at 167 beats per minute). Mrs Martin was seen in [facility] on 12/13/2021 after a mechanical fall from her horse. Later that day patient had presyncopal episode after getting up quickly to use the restroom which preceded by intense pain in the sacral area.  She admits to had taken multiple pain  "medications as well as muscle relaxant prior to the episode.  She also admits to dehydration during the day.  Describes feeling of diaphoresis and \"heat rush\" prior to  presyncope.  X-ray during ER visit was negative for fracture but MRI done at [facility] on 12/22 showed sacral fracture with bone contusions.   Mrs. Martin's other medical history includes transient episode of hyperthyroidism for which she was treated with methimazole with later improvement in thyroid functions. as well as diet-controlled hyperlipidemia.    Mrs Martin was physically active prior to sacral fracture with riding horses 3-4 times per week, doing pilates and using [employer] bike.   After sacral fracture she worked with physical therapy.  Her diet is mainly heart healthy although admit to occasional consumption of milk chocolate. She is able to maintain her weight. We encouraged her to continue with heart-healthy and salt-restricted diet and continue with physical activities as she tolerates.      1-: Mrs. Martin's TTE and cardiac MRI findings are suggestive of hypertrophic cardiomyopathy.  Currently she is mostly asymptomatic.  She rides horses 3-4 times per week and undergoing exercise training for competitive horse riding, which she would like to continue.  We had an extensive discussion about avoiding strenuous exercises in view of recent diagnosis of HCM.  So far her workup is not suggestive of high risk HCM. Circumstances of the most recent episode of pre-syncope are suggestive of vaso-vagal etiology. Will proceed with stress echocardiography to evaluate for provokable symptoms, provokable arrhythmia as well as hemodynamic response to exercise  which will complete risk stratification for sudden cardiac death as well as will help guiding recommendations for exercise intensity.  She takes diltiazem 180 mg daily as part of migraine management.  Will not make changes to medications.  During ER visit on " 12/13/2021 EKG showed prolonged QTc (595 ms) which was attributed to bupropion and duloxetine.  EKG during today's visit with improvement in QTc (426 ms).  Mrs. Martin will be seen HCM Clinic in 3 months.     Ms. Martin was seen and evaluated with Dr. Cabrera. Since last HCM clinic visit she had the following tests done:     2- HAL (Bike):    Stress ECG: No ST deviation is noted. The ECG was not diagnostic due to submaximal stress.    Left Ventricle: Left ventricular cavity size is normal. Wall thickness is severely increased. The left ventricular ejection fraction is 65%. Systolic function is normal.  Left ventricular global longitudinal strain is reduced at -12% . Wall motion is normal. There is severe asymmetric hypertrophy of the septal wall. Diastolic function is abnormal. There is outflow tract dynamic obstruction at rest. There is outflow tract dynamic obstruction with valsalva.    Mitral Valve: There is systolic anterior motion of the posterior leaflet and anterior leaflet.    Post Stress Echo: Left ventricle cavity has normal reduction in size post-stress. The left ventricle systolic function is hyperdynamic post-stress. The post-stress echo showed normal wall motion.    Stress ECG: Overall, the patient's exercise capacity was mildly impaired for their age. The patient reached stage 5.0 of the protocol after exercising for 8 min and 43 sec and had a maximal HR of 130 bpm (77 % of MPHR) and 6.3 METS. The patient experienced no angina during the test. The patient achieved their maximal exercise threshold. The patient reported dyspnea and fatigue during the stress test. Symptoms began during stress and ended during recovery.    Echo Post Impression: Wall motion is normal after submaximal stress. A gradient of approximately 40 mm Hg was noted at peak stress. Target heart rate was not reached.  Study was notable for a gradient of approximately 40 mm Hg at peak stress but did not achieve  target heart rate. Patient had a gradient of approximately 30 mm Hg with Valsalva at rest. Patient did not achieve target heart rate - but no wall motion abnormalities were noted at peak stress. Findings are consistent with hypertrophic non-obstructive cardiomyopathy. No evidence of arrhythmia noted during study.           Today, she reports being mostly asymptomatic from cardiac standpoint. She continues travelling for business purposes. She continues riding horses multiple times per week but avoids intense interval training. Ms. Martin still gets dizziness episodes related to vestibular migraine.  She originaly was prescribed metoprolol succinate 25 mg daily but she takes 12.5 mg daily due to side effect of fatigue. Her blood pressure is controlled on the current regimen. She has no resting or provoked murmur on exam. 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 We will proceed with annual TTE in 2 -2023 and she will be seen in HCM clinic in 12 month.     3-: Since her last office visit on 12-6-2022 she has kept up with her colonoscopies for screening purposes (most recent 2023) with a few diverticula noted in sigmoid colon but no other abnormalities noted. She was seen by the Orthopedic team in February for back/left gluteal pain with imaging noting L5-S1 disc degeneration. A referral was placed for pain management as well as physical therapy. A DEXA scan was recommended as well. She had an echocardiogram performed on 2- which showed: Left Ventricle: Wall thickness is severely increased. There is severe asymmetric hypertrophy of the septal wall (maximum wall thickness 23 mm). The left ventricular ejection fraction is 67%. Systolic function is vigorous. Global longitudinal strain is reduced at -16%. Wall motion is normal. Diastolic function is mildly abnormal, consistent with  "grade I (abnormal) relaxation. There is no LV dynamic obstruction with a peak gradient of 10.0 mmHg at rest and 25.0 mmHg with Valsalva. Left Atrium: The atrium is mildly dilated. Mitral Valve: There is mild regurgitation. There is systolic anterior motion without late peaking gradient.     Today she notes she is unfortunately not working after being laid off from her prior position. She did have COVID a few weeks ago and since that time has not been exercising regularly. She does try to stay active by riding horses 5-6 days a week and does occasionally use her Peloton but does not have a regular exercise routine at this time. While performing these activities she has no cardiac complaints. She has, however, noted lightheadedness with standing quickly. She does occasionally deal with vertigo, mostly during the Spring months and did have an hour long episode last night. She has prior history of vestibular migraines as well. Her blood pressure is acceptable today and she remains on metoprolol succinate 25 mg daily and diltiazem 180 mg daily. A low sodium diet was reinforced. She has had blood work with her primary physician in [place] and will send us reports. We have encouraged her to remain active, refrain from strenuous exercise, and follow a heart healthy diet. Family history was updated. A 53 year old brother now has cardiac problem and likely has HCM.She will return to the office in 6 months.     Family history: mother, alive, 73 year old, patient is not sure of her cardiac conditions; father, alive 75, no cardiac conditions. She has 2 brothers: 54 year old with narcolepsy and an \"arrhythmia\" and 42 year old with severe anxiety both of which she believes have not been screened. Mrs Martin has no children. Paternal grandmother had hypertension. No history of sudden death or cardiomypathy in close or distant family.      Genetic testing ([employer] 2-):           Review of Systems: Denies dyspnea, chest " pain, palpitations, lower extremity swelling, or no syncopal episodes since last HCM clinic visit.  She has history of dizziness due to vestibular vertigo.     Historical Information   Past Medical History:   Diagnosis Date    HOCM (hypertrophic obstructive cardiomyopathy) (HCC)     Migraines     QT prolongation      Past Surgical History:   Procedure Laterality Date    UMBILICAL HERNIA REPAIR      1997     Family History   Problem Relation Age of Onset    Breast cancer Mother     No Known Problems Father     Diabetes type II Maternal Grandmother     Colon polyps Maternal Grandfather     Alzheimer's disease Paternal Grandmother      Current Outpatient Medications on File Prior to Visit   Medication Sig Dispense Refill    Botox 200 units SOLR       buPROPion (WELLBUTRIN XL) 300 mg 24 hr tablet Take 300 mg by mouth Takes 375 mg once daily.      butalbital-acetaminophen-caffeine (FIORICET,ESGIC) -40 mg per tablet as needed Prn      cetirizine (ZyrTEC) 5 MG tablet Take 5 mg by mouth daily      Cyanocobalamin (VITAMIN B 12 PO) Take by mouth daily (Patient not taking: Reported on 10/29/2021)      diltiazem (CARDIZEM CD) 180 mg 24 hr capsule TAKE 1 CAPSULE BY MOUTH EVERY DAY 90 capsule 1    DULoxetine (CYMBALTA) 60 mg delayed release capsule Take 20 mg by mouth daily      estradiol (VIVELLE-DOT) 0.1 MG/24HR APPLY 1 PATCH TRANSDERMALLY TWICE A WEEK      HYDROcodone-acetaminophen (NORCO) 5-325 mg per tablet       HYDROcodone-acetaminophen (ZOLVIT)  mg/15 mL solution Take 1 tablet by mouth as needed  (Patient not taking: Reported on 10/29/2021)      hydrocortisone 2.5 % lotion Apply 2.5 % topically as needed       metoprolol succinate (TOPROL-XL) 25 mg 24 hr tablet TAKE 1/2 TABLET (12.5 MG TOTAL) BY MOUTH ONCE DAILY 45 tablet 4    Omeprazole 20 MG TBEC as needed       Progesterone 100 MG CAPS       Pseudoephedrine-Ibuprofen (Advil Cold/Sinus)  MG TABS Take by mouth      sodium picosulfate, magnesium oxide,  "citric acid (Clenpiq) 10-3.5-12 MG-GM -GM/160ML SOLN Take 1 kit by mouth see administration instructions Follow instructions given at office 160 mL 0    tobramycin-dexamethasone (TOBRADEX) ophthalmic suspension Administer 1 drop into the left eye every 4 (four) hours while awake 5 mL 0     No current facility-administered medications on file prior to visit.     Allergies   Allergen Reactions    Banana - Food Allergy      Tummy upset and diarrhea    Ciprofloxacin     Sulfamethoxazole-Trimethoprim      dizzy       Social History     Substance and Sexual Activity   Alcohol Use Not Currently     Social History     Substance and Sexual Activity   Drug Use Never     Social History     Tobacco Use   Smoking Status Never   Smokeless Tobacco Never     Objective   Vitals:   Vitals:    09/17/24 1005   BP: 108/64   Pulse: 81   SpO2: 98%   Weight: 76.7 kg (169 lb)   Height: 5' 9\" (1.753 m)   Body surface area is 1.92 meters squared.  Body mass index is 24.96 kg/m².    Invasive Devices       None                 Physical Exam:  GEN: Kandis Martin appears well, alert and oriented x 3, pleasant and cooperative   HEENT: pupils equal, round, and reactive to light; extraocular muscles intact  NECK: supple, no carotid bruits   HEART: regular rhythm, normal S1 and S2, no murmurs, clicks, gallops or rubs   LUNGS: clear to auscultation bilaterally; no wheezes, rales, or rhonchi   ABDOMEN: normal bowel sounds, soft, no tenderness, no distention  EXTREMITIES: peripheral pulses normal; no clubbing, cyanosis, or edema  NEURO: no focal findings   SKIN: normal without suspicious lesions on exposed skin    Lab Results:   Lab Results   Component Value Date    WBC 11.27 (H) 12/12/2021    RBC 4.59 12/12/2021    HGB 13.8 12/12/2021    HCT 43.3 12/12/2021    MCV 94 12/12/2021     12/12/2021    RDW 12.1 12/12/2021     Lab Results   Component Value Date    K 4.6 12/12/2021     12/12/2021    CO2 27 12/12/2021    BUN 20 12/12/2021    " "CREATININE 0.98 2021    EGFR 66 2021    CALCIUM 9.9 2021    AST 21 2021    ALT 50 2021    ALKPHOS 108 2021     No results found for: \"MG\"  No results found for: \"CHOL\", \"HDL\", \"TRIG\", \"LDLCALC\"  Lab Results   Component Value Date    VVF1HUYSIWJO 0.336 (L) 2021    FREET4 0.72 (L) 2021    Q4JESEU 0.80 2021     Imaging:   I have personally reviewed pertinent films in PACS    Cardiac testing:   Results for orders placed during the hospital encounter of 10/01/21    Echo complete with contrast if indicated    [place] [address]    Transthoracic Echocardiogram  2D, M-mode, Doppler, and Color Doppler    Study date:  01-Oct-2021    Patient: JAMI WINTER  MR number: [id number]  Account number: [id number]  : 1970  Age: 51 years  Gender: Female  Status: Outpatient  Location: [facility]  Height: 69 in  Weight: 163 lb  BP: 120/ 76 mmHg    Indications: Palpitations    Diagnoses: R00.2 - Palpitations    Sonographer:  ASHLEY Preciado  Referring Physician:  Yaquelin Cardenas MD  Group:  [facility]  Interpreting Physician:  Patel Nolasco MD    IMPRESSIONS:  The echocardiographic study was abnormal. Features are consistent with non-obstructive hypertrophic cardiomyopathy with severe asymmetric septal hypertrophy.    SUMMARY    LEFT VENTRICLE:  Systolic function was normal. Ejection fraction was estimated to be 65 %. The peak global longitudinal strain was reduced at -14.2%, with severe regional reductions in the basal-mid anteroseptal wall.  There were no regional wall motion abnormalities.  Wall thickness was markedly increased (IVSd 2.0 cm).  There was severe assymetrical hypertrophy of the septum.    RIGHT VENTRICLE:  Wall thickness was mildly increased.    MITRAL VALVE:  There was mild systolic anterior motion of the anterior leaflet.  There was trace " regurgitation.    TRICUSPID VALVE:  There was trace regurgitation.    HISTORY: PRIOR HISTORY: Palpitations    PROCEDURE: The study was performed in the [facility]. This was a routine study. The transthoracic approach was used. The study included complete 2D imaging, M-mode, complete spectral Doppler, and color Doppler. The heart rate was 83  bpm, at the start of the study.    LEFT VENTRICLE: Size was normal. Systolic function was normal. Ejection fraction was estimated to be 65 %. The peak global longitudinal strain was reduced at -14.2%, with severe regional reductions in the basal-mid anteroseptal wall. There  were no regional wall motion abnormalities. Wall thickness was markedly increased (IVSd 2.0 cm). There was severe assymetrical hypertrophy of the septum. No evidence of apical thrombus. DOPPLER: There was fusion of early and atrial  contributions to ventricular filling.    RIGHT VENTRICLE: The size was normal. Systolic function was normal. Wall thickness was mildly increased.    LEFT ATRIUM: Size was normal.    RIGHT ATRIUM: Size was normal.    MITRAL VALVE: Valve structure was normal. There was normal leaflet separation. There was mild systolic anterior motion of the anterior leaflet. DOPPLER: The transmitral velocity was within the normal range. There was no evidence for  stenosis. There was trace regurgitation.    AORTIC VALVE: There was no evidence of left ventricular outflow obstruction. The valve was trileaflet. Leaflets exhibited normal thickness and normal cuspal separation. DOPPLER: Transaortic velocity was within the normal range. There was  no evidence for stenosis. There was no significant regurgitation.    TRICUSPID VALVE: The valve structure was normal. There was normal leaflet separation. DOPPLER: The transtricuspid velocity was within the normal range. There was no evidence for stenosis. There was trace regurgitation.    PULMONIC VALVE: Leaflets exhibited normal thickness, no  "calcification, and normal cuspal separation. DOPPLER: The transpulmonic velocity was within the normal range. There was no significant regurgitation.    PERICARDIUM: There was no pericardial effusion. The pericardium was normal in appearance.    AORTA: The root exhibited normal size.    SYSTEMIC VEINS: IVC: The inferior vena cava was normal in size.    SYSTEM MEASUREMENT TABLES    2D  %FS: 42.17 %  AVC: 346.25 ms  Ao Diam: 3.12 cm  EDV(Teich): 56.63 ml  EF(Teich): 74.05 %  ESV(Teich): 14.7 ml  IVSd: 2.25 cm  LA Diam: 3.62 cm  LAAs A4C: 16.92 cm2  LAESV A-L A4C: 46.49 ml  LAESV MOD A4C: 44.36 ml  LALs A4C: 5.23 cm  LVEDV MOD A4C: 54.1 ml  LVEF MOD A4C: 69.1 %  LVESV MOD A4C: 16.72 ml  LVIDd: 3.66 cm  LVIDs: 2.12 cm  LVLd A4C: 7.68 cm  LVLs A4C: 5.29 cm  LVPWd: 0.96 cm  RAAs A4C: 14.61 cm2  RAESV A-L: 36.79 ml  RAESV MOD: 35.1 ml  RALs: 4.93 cm  RVIDd: 3.24 cm  RWT: 0.52  SV MOD A4C: 37.39 ml  SV(Teich): 41.93 ml    MM  TAPSE: 2.19 cm    [facility]    Prepared and electronically signed by    Patel Nolasco MD  Signed 01-Oct-2021 14:17:05    Name: Kandis Martin                       : 1970  MRN: [id number]                       Age: 54 y.o.  Patient Status: Outpatient          Gender: female  Echo complete w/ strain    Height: 5' 9\" (1.753 m)   Weight: 77.6 kg (171 lb)   BSA: 1.93 m²   Blood Pressure: 129/82    Date of Study: 24   Ordering Provider: Ni Cabrera DO   Clinical Indications: HOCM (hypertrophic obstructive cardiomyopathy) (HCC) [I42.1 (ICD-10-CM)]       Reading Physicians  Performing Staff   Cardiology: Johnny Barboza MD    Tech: Peggy Mittal, RS        Vitals    Height Weight BSA (Calculated - m2) BP Pulse   5' 9\" (1.753 m) 77.6 kg (171 lb) 1.93 sq meters 129/82 82     PACS Images     Show images for Echo complete w/ contrast if indicated  Study Details    This transthoracic echocardiogram was performed in the echo lab. " This was a routine, outpatient study. Study quality was adequate. A complete 2D, color flow Doppler, spectral Doppler, 2D, color flow Doppler, spectral Doppler and strain transthoracic echocardiogram was performed.  The apical, parasternal, subcostal and suprasternal views were obtained.     History    HOCM, Hyperthyroidism, Syncope and collapse     Interpretation Summary  Show Result Comparison     Left Ventricle: Wall thickness is severely increased. There is severe asymmetric hypertrophy of the septal wall (maximum wall thickness 23 mm). The left ventricular ejection fraction is 67%. Systolic function is vigorous. Global longitudinal strain is reduced at -16%. Wall motion is normal. Diastolic function is mildly abnormal, consistent with grade I (abnormal) relaxation. There is no LV dynamic obstruction with a peak gradient of 10.0 mmHg at rest and 25.0 mmHg with Valsalva.    Left Atrium: The atrium is mildly dilated.    Mitral Valve: There is mild regurgitation. There is systolic anterior motion without late peaking gradient.     Strain was performed to quantify interventricular dyssynchrony and evaluate components of myocardial function due to  HCM. Results from the utilization of Strain Analysis are listed in the report below.     Findings    Left Ventricle Left ventricular cavity size is small. Wall thickness is severely increased. There is severe asymmetric hypertrophy of the septal wall (maximum wall thickness 23 mm). The left ventricular ejection fraction is 67%. Systolic function is vigorous. Global longitudinal strain is reduced at -16%.  Wall motion is normal. Diastolic function is mildly abnormal, consistent with grade I (abnormal) relaxation.  There is no LV dynamic obstruction with a peak gradient of 10.0 mmHg at rest and 25.0 mmHg with Valsalva.   Right Ventricle Right ventricular cavity size is normal. Systolic function is normal. Wall thickness is normal.   Left Atrium The atrium is mildly dilated.    Right Atrium The atrium is normal in size.   Aortic Valve The aortic valve is trileaflet. The leaflets are not thickened. The leaflets are not calcified. The leaflets exhibit normal mobility. There is no evidence of regurgitation. The aortic valve has no significant stenosis.   Mitral Valve Mitral valve structure is normal.  There is mild regurgitation. There is no evidence of stenosis. There is systolic anterior motion without late peaking gradient.   Tricuspid Valve Tricuspid valve structure is normal. There is trace regurgitation. There is no evidence of stenosis. The right ventricular systolic pressure is normal. The estimated right ventricular systolic pressure is 29.00 mmHg.   Pulmonic Valve Pulmonic valve structure is normal. There is trace regurgitation. There is no evidence of stenosis.   Ascending Aorta The aortic root is normal in size.   IVC/SVC The right atrial pressure is estimated at 3.0 mmHg. The inferior vena cava is normal in size.   Pericardium There is no pericardial effusion. The pericardium is normal in appearance.     Left Ventricle Measurements    Function/Volumes   A4C EF 67 %         LVOT stroke volume 80.74         LVOT stroke volume index 39.9 ml/m2         Left ventricular stroke volume (2D) 38 mL         LVOT Cardiac Output 5.28 l/min         LVOT Cardiac Index 2.73 l/min/m2         Dimensions   LVIDd 3.6 cm         LVIDS 2.2 cm         IVSd 2 cm         LVPWd 1.1 cm         LVOT area 3.8 cm2         FS 39         Diastolic Filling   MV E' Tissue Velocity Septal 5 cm/s         MV E' Tissue Velocity Lateral 7 cm/s         LA Volume Index (BP) 30.1 mL/m2         E/A ratio 0.55         E wave deceleration time 179 ms         MV Peak E Sebastien 47 cm/s         MV Peak A Sebastien 0.85 m/s         Strain   GLS -16 %          Report Measurements   AV LVOT peak gradient 4 mmHg         Other Measurements   Peak gradient (Rest) 10 mmHg         Peak Gradient (Valsalva) 25 mmHg               Interventricular Septum Measurements    Shunt Ratio   LVOT peak VTI 21.25 cm         LVOT peak sebastien 0.97 m/s              Right Ventricle Measurements    Dimensions   RVID d 3.3 cm         Tricuspid annular plane systolic excursion 2.1 cm               Left Atrium Measurements    Dimensions   LA size 4.1 cm         LA length (A2C) 5.1 cm         Volumes   LA volume (BP) 58 mL         LA Volume Index (BP) 30.1 mL/m2               Right Atrium Measurements    Dimensions   RAA A4C 15 cm2               Atrial Septum Measurements    Shunt Ratio   LVOT peak VTI 21.25 cm         LVOT peak sebastien 0.97 m/s               Aortic Valve Measurements    Stenosis   LVOT peak sebastien 0.97 m/s         LVOT peak VTI 21.25 cm         LVOT mn grad 2 mmHg         AV peak gradient 5 mmHg         AV LVOT peak gradient 4 mmHg         Area/Dimensions   AV area peak sebastien 3.4 cm2         LVOT diameter 2.2 cm         LVOT area 3.8 cm2               Mitral Valve Measurements    Stenosis   MV stenosis pressure 1/2 time 52 ms         MV valve area p 1/2 method 4.23               Tricuspid Valve Measurements    RVSP Parameters   TR Peak Sebastien 2.6 m/s         Est. RA pres 3 mmHg         Triscuspid Valve Regurgitation Peak Gradient 26 mmHg         Right Ventricular Peak Systolic Pressure 29 mmHg               Aorta Measurements    Aortic Dimensions   Ao root 3.3 cm         Asc Ao 3.2 cm               IVC/SVC Measurements    IVC/SVC   Est. RA pres 3 mmHg              Exam Details    Performed Procedure Technologist Supporting Staff Performing Physician   Echo complete w/ strain JOEL Romero           Appointment Date/Status Modality Department    2/29/2024     Completed [facility] ECHO 2 [facility] CAR NON INV           Begin Exam End Exam  End Exam Questionnaires   2/29/2024  8:04 AM 2/29/2024  8:50 AM  PATIENT EDUCATION            All Reviewers List    Johnny Barboza MD on 3/1/2024  1:31 PM   Ni Cabrera DO on 2/29/2024 10:27 PM     Counseling /  Coordination of Care  Total time spent today 35 minutes.  Greater than 50% of total time was spent with the patient and / or family counseling and / or coordination of care.

## 2024-09-17 ENCOUNTER — OFFICE VISIT (OUTPATIENT)
Dept: CARDIOLOGY CLINIC | Facility: CLINIC | Age: 54
End: 2024-09-17
Payer: COMMERCIAL

## 2024-09-17 VITALS
WEIGHT: 169 LBS | HEART RATE: 81 BPM | BODY MASS INDEX: 25.03 KG/M2 | DIASTOLIC BLOOD PRESSURE: 64 MMHG | OXYGEN SATURATION: 98 % | HEIGHT: 69 IN | SYSTOLIC BLOOD PRESSURE: 108 MMHG

## 2024-09-17 DIAGNOSIS — R94.31 QT PROLONGATION: Primary | ICD-10-CM

## 2024-09-17 DIAGNOSIS — I42.1 HOCM (HYPERTROPHIC OBSTRUCTIVE CARDIOMYOPATHY) (HCC): ICD-10-CM

## 2024-09-17 DIAGNOSIS — R55 SYNCOPE AND COLLAPSE: ICD-10-CM

## 2024-09-17 PROCEDURE — 99214 OFFICE O/P EST MOD 30 MIN: CPT | Performed by: INTERNAL MEDICINE

## 2024-10-14 DIAGNOSIS — I42.1 HOCM (HYPERTROPHIC OBSTRUCTIVE CARDIOMYOPATHY) (HCC): ICD-10-CM

## 2024-10-15 RX ORDER — DILTIAZEM HYDROCHLORIDE 180 MG/1
180 CAPSULE, COATED, EXTENDED RELEASE ORAL DAILY
Qty: 30 CAPSULE | Refills: 0 | Status: SHIPPED | OUTPATIENT
Start: 2024-10-15

## 2024-10-29 LAB — HBA1C MFR BLD HPLC: 6.1 %

## 2024-11-16 DIAGNOSIS — I42.1 HOCM (HYPERTROPHIC OBSTRUCTIVE CARDIOMYOPATHY) (HCC): ICD-10-CM

## 2024-11-18 RX ORDER — DILTIAZEM HYDROCHLORIDE 180 MG/1
180 CAPSULE, COATED, EXTENDED RELEASE ORAL DAILY
Qty: 30 CAPSULE | Refills: 5 | Status: SHIPPED | OUTPATIENT
Start: 2024-11-18

## 2025-01-16 ENCOUNTER — TELEPHONE (OUTPATIENT)
Dept: CARDIOLOGY CLINIC | Facility: CLINIC | Age: 55
End: 2025-01-16

## 2025-01-16 NOTE — TELEPHONE ENCOUNTER
L/m/m asking for lab results they were going to get in Oct/Nov in [place]. Also reminded of echo and appt  3/18/25   Left fax # and telephone #

## 2025-02-25 DIAGNOSIS — I42.1 HOCM (HYPERTROPHIC OBSTRUCTIVE CARDIOMYOPATHY) (HCC): ICD-10-CM

## 2025-02-26 RX ORDER — METOPROLOL SUCCINATE 25 MG/1
TABLET, EXTENDED RELEASE ORAL
Qty: 45 TABLET | Refills: 1 | Status: SHIPPED | OUTPATIENT
Start: 2025-02-26

## 2025-03-17 NOTE — PROGRESS NOTES
HCM Clinic Follow-up Visit - Cardiology   Kandis Martin 54 y.o. female MRN: [id number]  Unit/Bed#:  Encounter: [id number]    Patient Active Problem List    Diagnosis Date Noted    Facet arthropathy, lumbar 02/01/2022    QT prolongation 12/12/2021    HOCM (hypertrophic obstructive cardiomyopathy) (HCC) 12/12/2021    Syncope and collapse 12/12/2021    Depression, unspecified 12/12/2021    Abnormal thyroid function test 10/29/2021    H/O hyperthyroidism 10/29/2021     Plan: Ms Kandis Montes was last seen in the HCM Clinic on 9-. 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 She also has comorbid conditions of hyperglycemia, hyperlipidemia, vestibular vertigo and chronic lower back pain. Today, she states that she has no significant cardiovascular symptom. She denies dyspnea, chest pain, palpitations, lower extremity swelling, or syncopal episodes. She has not had any recent attack of vestibular dizziness either. She does hydrate herself well and has remained active with walking and piliates and has lost about 14 lbs on tirzepatide. Examination reveals no murmur and no sign of cardiovascular decompensation. She has been started on Adderall recently and has been taking pseudoephedrine-ibuprofen for migraine headaches and we discussed the potential risk of cardiac arrhythmias. Ms Montes has had recent blood work elsewhere and she will be sending the results to us by email. Our plan is to re-risk stratify Ms Montes in the next 12 months. Family history is updated. An echocardiogram performed in the office today shows:      Left Ventricle: Wall thickness is severely increased. There is severe asymmetric hypertrophy of the septal wall (maximum wall thickness 23 mm). The left ventricular ejection fraction is 66%. Systolic function is vigorous. Global longitudinal strain is reduced at -15%. Wall motion is normal. Diastolic function is mildly abnormal,  consistent with grade I (abnormal) relaxation. There is no LV dynamic obstruction.    Left Atrium: The atrium is mildly dilated.    Mitral Valve: There is mild regurgitation. There is systolic anterior motion without late peaking gradient.    Physician Requesting Consult: Dr Blanca Cardenas  Reason for Consult / Principal Problem: HOCM     HPI: Ms Kandis Martin is a 52 year old female with history of hyperthyroidism, vestibular migraines and recent diagnosis of hypertrophic cardiomyopathy was referred to HCM Clinic for further management. Initial cardiac workup was initiated for shortness of breath and chest pain and included EKG which showed biatrial enlargement. That led to TTE in  (in  [place], [place] ) which was significant for mild concentric left ventricular hypertrophy, and severe left atrial enlargement.  She followed with [facility] Cardiology and had a repeat TTE on 10/01/2021 which showed normal left ventricular systolic function (LVEF 65% ), reduced global longitudinal strain (-14.2%) with severe regional reduction in the basal -mid anteroseptal wall, no regional wall motion abnormalities, severe asymmetric hypertrophy of septum (2 cm), mildly increased right ventricle, mild systolic anterior motion of anterior leaflet of mitral valve, trace mitral valve and tricuspid valve regurgitations.  Cardiac MRI from 10/25/2021 revealed findings consistent with hypertrophic cardiomyopathy, such as increased myocardial wall thickness at mid anteroseptal and inferoseptal walls with subtle hazy delayed mid myocardial enhancement throughout the mid septum,  mild systolic motion of chordae of the anterior mitral valve leaflet, as well as mild left atrial enlargement.  Extended ambulatory heart monitoring (12 days 18 hours) from  was mostly benign with 1 run of supraventricular tachycardia (4 beats at 167 beats per minute). Mrs Martin was seen in [facility] on 12/13/2021  "after a mechanical fall from her horse. Later that day patient had presyncopal episode after getting up quickly to use the restroom which preceded by intense pain in the sacral area.  She admits to had taken multiple pain medications as well as muscle relaxant prior to the episode.  She also admits to dehydration during the day.  Describes feeling of diaphoresis and \"heat rush\" prior to  presyncope.  X-ray during ER visit was negative for fracture but MRI done at [facility] on 12/22 showed sacral fracture with bone contusions.   Mrs. Martin's other medical history includes transient episode of hyperthyroidism for which she was treated with methimazole with later improvement in thyroid functions. as well as diet-controlled hyperlipidemia.    Mrs Martin was physically active prior to sacral fracture with riding horses 3-4 times per week, doing pilates and using Peloton bike.   After sacral fracture she worked with physical therapy.  Her diet is mainly heart healthy although admit to occasional consumption of milk chocolate. She is able to maintain her weight. We encouraged her to continue with heart-healthy and salt-restricted diet and continue with physical activities as she tolerates.      1-: Mrs. Martin's TTE and cardiac MRI findings are suggestive of hypertrophic cardiomyopathy.  Currently she is mostly asymptomatic.  She rides horses 3-4 times per week and undergoing exercise training for competitive horse riding, which she would like to continue.  We had an extensive discussion about avoiding strenuous exercises in view of recent diagnosis of HCM.  So far her workup is not suggestive of high risk HCM. Circumstances of the most recent episode of pre-syncope are suggestive of vaso-vagal etiology. Will proceed with stress echocardiography to evaluate for provokable symptoms, provokable arrhythmia as well as hemodynamic response to exercise  which will complete risk stratification for " sudden cardiac death as well as will help guiding recommendations for exercise intensity.  She takes diltiazem 180 mg daily as part of migraine management.  Will not make changes to medications.  During ER visit on 12/13/2021 EKG showed prolonged QTc (595 ms) which was attributed to bupropion and duloxetine.  EKG during today's visit with improvement in QTc (426 ms).  Mrs. Martin will be seen HCM Clinic in 3 months.     Ms. Martin was seen and evaluated with Dr. Cabrera. Since last HCM clinic visit she had the following tests done:     2- HAL (Bike):    Stress ECG: No ST deviation is noted. The ECG was not diagnostic due to submaximal stress.    Left Ventricle: Left ventricular cavity size is normal. Wall thickness is severely increased. The left ventricular ejection fraction is 65%. Systolic function is normal.  Left ventricular global longitudinal strain is reduced at -12% . Wall motion is normal. There is severe asymmetric hypertrophy of the septal wall. Diastolic function is abnormal. There is outflow tract dynamic obstruction at rest. There is outflow tract dynamic obstruction with valsalva.    Mitral Valve: There is systolic anterior motion of the posterior leaflet and anterior leaflet.    Post Stress Echo: Left ventricle cavity has normal reduction in size post-stress. The left ventricle systolic function is hyperdynamic post-stress. The post-stress echo showed normal wall motion.    Stress ECG: Overall, the patient's exercise capacity was mildly impaired for their age. The patient reached stage 5.0 of the protocol after exercising for 8 min and 43 sec and had a maximal HR of 130 bpm (77 % of MPHR) and 6.3 METS. The patient experienced no angina during the test. The patient achieved their maximal exercise threshold. The patient reported dyspnea and fatigue during the stress test. Symptoms began during stress and ended during recovery.    Echo Post Impression: Wall motion is normal after  submaximal stress. A gradient of approximately 40 mm Hg was noted at peak stress. Target heart rate was not reached.  Study was notable for a gradient of approximately 40 mm Hg at peak stress but did not achieve target heart rate. Patient had a gradient of approximately 30 mm Hg with Valsalva. Patient did not achieve target heart rate - but no wall motion abnormalities were noted at stress. Findings are consistent with hypertrophic non-obstructive cardiomyopathy. No evidence of arrhythmia noted during study.           Today, she reports being mostly asymptomatic from cardiac standpoint. She continues travelling for business purposes. She continues riding horses multiple times per week but avoids intense interval training. Ms. Martin still gets dizziness episodes related to vestibular migraine.  She originaly was prescribed metoprolol succinate 25 mg daily but she takes 12.5 mg daily due to side effect of fatigue. Her blood pressure is controlled on the current regimen. She has no resting or provoked murmur on exam. 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 We will proceed with annual TTE in 2 -2023 and she will be seen in HCM clinic in 12 month.      3-: Since her last office visit on 12-6-2022 she has kept up with her colonoscopies for screening purposes (most recent 2023) with a few diverticula noted in sigmoid colon but no other abnormalities noted. She was seen by the Orthopedic team in February for back/left gluteal pain with imaging noting L5-S1 disc degeneration. A referral was placed for pain management as well as physical therapy. A DEXA scan was recommended as well. She had an echocardiogram performed on 2- which showed: Left Ventricle: Wall thickness is severely increased. There is severe asymmetric hypertrophy of the septal wall (maximum wall thickness 23 mm). The  left ventricular ejection fraction is 67%. Systolic function is vigorous. Global longitudinal strain is reduced at -16%. Wall motion is normal. Diastolic function is mildly abnormal, consistent with grade I (abnormal) relaxation. There is no LV dynamic obstruction with a peak gradient of 10.0 mmHg at rest and 25.0 mmHg with Valsalva. Left Atrium: The atrium is mildly dilated. Mitral Valve: There is mild regurgitation. There is systolic anterior motion without late peaking gradient.     Today she notes she is unfortunately not working after being laid off from her prior position. She did have COVID a few weeks ago and since that time has not been exercising regularly. She does try to stay active by riding horses 5-6 days a week and does occasionally use her Peloton but does not have a regular exercise routine at this time. While performing these activities she has no cardiac complaints. She has, however, noted lightheadedness with standing quickly. She does occasionally deal with vertigo, mostly during the Spring months and did have an hour long episode last night. She has prior history of vestibular migraines as well. Her blood pressure is acceptable today and she remains on metoprolol succinate 25 mg daily and diltiazem 180 mg daily. A low sodium diet was reinforced. She has had blood work with her primary physician in [place] and will send us reports. We have encouraged her to remain active, refrain from strenuous exercise, and follow a heart healthy diet. Family history was updated. A 53 year old brother now has cardiac problem and likely has HCM.She will return to the office in 6 months.    9-: Ms Martin continues to have low back pain that is managed by visiting a chiropractor. The back pain has been somewhat limiting but she does continue to stay active by riding her horses, riding her stationary bike, attending pilates classes and walking up to 8 miles a day when she is in [place]. Throughout  these activities she has not experienced dyspnea, chest pain, palpitations, lower extremity swelling, or syncope except when it is hot and humid when she would experience shortness of breath and decreased exercise tolerance. She has history of dizziness due to vestibular vertigo but that has been quite infrequent. She has had COVID earlier this year and she was encouraged to obtain the new vaccine. Her BP is consistently controlled on diltiazem and metoprolol and she has no murmur on cardiac auscultation today. She does follow a low salt heart healthy diet and her weight has been steady with a BMI of 24.96 kg/m². She has not found a job yet and is back to school to obtain a masters degree in industrial and institutional psychology. She will soon be participating in her last horse racing activity in [place] after which she would retire from such activities. She has brought in a copy of her laboratory tests performed in August of 2023 elsewhere with her. CBC, CMP, TSH, Vit D level, folate, B12 and urinalysis are normal. Hemoglobin A1C is 6% and her LDL is 134. She will have new blood work next week and she will forward a copy to us when available.       Family history: mother, alive, 74 year old, patient is not sure of her cardiac conditions; father, alive 76, no cardiac conditions. She has 2 brothers: 55 year old with narcolepsy and HCM as well as atrial fibrillation, and 43 year old with severe anxiety. Ms Martin has no children. Paternal grandmother had hypertension. No history of sudden death or cardiomypathy in close or distant family.      Genetic testing ([employer] 2-):           Review of Systems: Denies dyspnea, chest pain, palpitations, lower extremity swelling, or syncopal episodes.  She has history of dizziness due to vestibular vertigo.        Historical Information   Past Medical History:   Diagnosis Date    HOCM (hypertrophic obstructive cardiomyopathy) (HCC)     Migraines     QT prolongation       Past Surgical History:   Procedure Laterality Date    UMBILICAL HERNIA REPAIR      1997     Family History   Problem Relation Age of Onset    Breast cancer Mother     No Known Problems Father     Diabetes type II Maternal Grandmother     Colon polyps Maternal Grandfather     Alzheimer's disease Paternal Grandmother      Current Outpatient Medications on File Prior to Visit   Medication Sig Dispense Refill    Botox 200 units SOLR       buPROPion (WELLBUTRIN XL) 300 mg 24 hr tablet Take 300 mg by mouth Takes 375 mg once daily.      butalbital-acetaminophen-caffeine (FIORICET,ESGIC) -40 mg per tablet as needed Prn      cetirizine (ZyrTEC) 5 MG tablet Take 5 mg by mouth daily      Cyanocobalamin (VITAMIN B 12 PO) Take by mouth daily (Patient not taking: Reported on 10/29/2021)      diltiazem (CARDIZEM CD) 180 mg 24 hr capsule TAKE 1 CAPSULE BY MOUTH EVERY DAY 30 capsule 5    DULoxetine (CYMBALTA) 60 mg delayed release capsule Take 20 mg by mouth daily      estradiol (VIVELLE-DOT) 0.1 MG/24HR APPLY 1 PATCH TRANSDERMALLY TWICE A WEEK      HYDROcodone-acetaminophen (NORCO) 5-325 mg per tablet       HYDROcodone-acetaminophen (ZOLVIT)  mg/15 mL solution Take 1 tablet by mouth as needed  (Patient not taking: Reported on 10/29/2021)      hydrocortisone 2.5 % lotion Apply 2.5 % topically as needed       metoprolol succinate (TOPROL-XL) 25 mg 24 hr tablet TAKE 1/2 TABLET (12.5 MG TOTAL) ONCE DAILY 45 tablet 1    Omeprazole 20 MG TBEC as needed       Progesterone 100 MG CAPS       Pseudoephedrine-Ibuprofen (Advil Cold/Sinus)  MG TABS Take by mouth      sodium picosulfate, magnesium oxide, citric acid (Clenpiq) 10-3.5-12 MG-GM -GM/160ML SOLN Take 1 kit by mouth see administration instructions Follow instructions given at office 160 mL 0    tobramycin-dexamethasone (TOBRADEX) ophthalmic suspension Administer 1 drop into the left eye every 4 (four) hours while awake 5 mL 0     No current facility-administered  "medications on file prior to visit.     Allergies   Allergen Reactions    Banana - Food Allergy      Tummy upset and diarrhea    Ciprofloxacin     Sulfamethoxazole-Trimethoprim      dizzy       Social History     Substance and Sexual Activity   Alcohol Use Not Currently     Social History     Substance and Sexual Activity   Drug Use Never     Social History     Tobacco Use   Smoking Status Never   Smokeless Tobacco Never     Objective   Vitals:  Vitals:    03/18/25 1007   BP: 112/76   BP Location: Right arm   Patient Position: Sitting   Cuff Size: Standard   Pulse: (!) 52   SpO2: 99%   Weight: 71.3 kg (157 lb 3.2 oz)   Height: 5' 9\" (1.753 m)   Body surface area is 1.86 meters squared.  Body mass index is 23.21 kg/m².    Invasive Devices       None                 Physical Exam:  GEN: Kandis Martin appears well, alert and oriented x 3, pleasant and cooperative   HEENT: pupils equal, round, and reactive to light; extraocular muscles intact  NECK: supple, no carotid bruits   HEART: regular rhythm, normal S1 and S2, no murmurs, clicks, gallops or rubs   LUNGS: clear to auscultation bilaterally; no wheezes, rales, or rhonchi   ABDOMEN: normal bowel sounds, soft, no tenderness, no distention  EXTREMITIES: peripheral pulses normal; no clubbing, cyanosis, or edema  NEURO: no focal findings   SKIN: normal without suspicious lesions on exposed skin    Lab Results:   Lab Results   Component Value Date    WBC 11.27 (H) 12/12/2021    RBC 4.59 12/12/2021    HGB 13.8 12/12/2021    HCT 43.3 12/12/2021    MCV 94 12/12/2021     12/12/2021    RDW 12.1 12/12/2021     Lab Results   Component Value Date    K 4.6 12/12/2021     12/12/2021    CO2 27 12/12/2021    BUN 20 12/12/2021    CREATININE 0.98 12/12/2021    EGFR 66 12/12/2021    CALCIUM 9.9 12/12/2021    AST 21 09/27/2021    ALT 50 09/27/2021    ALKPHOS 108 09/27/2021     No results found for: \"MG\"  No results found for: \"CHOL\", \"HDL\", \"TRIG\", \"LDLCALC\"  Lab " Results   Component Value Date    BJU0PCHGRCFP 0.336 (L) 2021    FREET4 0.72 (L) 2021    E1SGQBC 0.80 2021     Imaging:   I have personally reviewed pertinent films in PACS    EKG:     Cardiac testing:   Results for orders placed during the hospital encounter of 10/01/21    Echo complete with contrast if [place] [address]    Transthoracic Echocardiogram  2D, M-mode, Doppler, and Color Doppler    Study date:  01-Oct-2021    Patient: JAMI WINTER  MR number: [id number]  Account number: [id number]  : 1970  Age: 51 years  Gender: Female  Status: Outpatient  Location: [facility]  Height: 69 in  Weight: 163 lb  BP: 120/ 76 mmHg    Indications: Palpitations    Diagnoses: R00.2 - Palpitations    Sonographer:  ASHLEY Preciado  Referring Physician:  Yaquelin Cardenas MD  Group:  [facility]  Interpreting Physician:  Patel Nolasco MD    IMPRESSIONS:  The echocardiographic study was abnormal. Features are consistent with non-obstructive hypertrophic cardiomyopathy with severe asymmetric septal hypertrophy.    SUMMARY    LEFT VENTRICLE:  Systolic function was normal. Ejection fraction was estimated to be 65 %. The peak global longitudinal strain was reduced at -14.2%, with severe regional reductions in the basal-mid anteroseptal wall.  There were no regional wall motion abnormalities.  Wall thickness was markedly increased (IVSd 2.0 cm).  There was severe assymetrical hypertrophy of the septum.    RIGHT VENTRICLE:  Wall thickness was mildly increased.    MITRAL VALVE:  There was mild systolic anterior motion of the anterior leaflet.  There was trace regurgitation.    TRICUSPID VALVE:  There was trace regurgitation.    HISTORY: PRIOR HISTORY: Palpitations    PROCEDURE: The study was performed in the [facility]. This was a routine study. The transthoracic approach was used.  The study included complete 2D imaging, M-mode, complete spectral Doppler, and color Doppler. The heart rate was 83  bpm, at the start of the study.    LEFT VENTRICLE: Size was normal. Systolic function was normal. Ejection fraction was estimated to be 65 %. The peak global longitudinal strain was reduced at -14.2%, with severe regional reductions in the basal-mid anteroseptal wall. There  were no regional wall motion abnormalities. Wall thickness was markedly increased (IVSd 2.0 cm). There was severe assymetrical hypertrophy of the septum. No evidence of apical thrombus. DOPPLER: There was fusion of early and atrial  contributions to ventricular filling.    RIGHT VENTRICLE: The size was normal. Systolic function was normal. Wall thickness was mildly increased.    LEFT ATRIUM: Size was normal.    RIGHT ATRIUM: Size was normal.    MITRAL VALVE: Valve structure was normal. There was normal leaflet separation. There was mild systolic anterior motion of the anterior leaflet. DOPPLER: The transmitral velocity was within the normal range. There was no evidence for  stenosis. There was trace regurgitation.    AORTIC VALVE: There was no evidence of left ventricular outflow obstruction. The valve was trileaflet. Leaflets exhibited normal thickness and normal cuspal separation. DOPPLER: Transaortic velocity was within the normal range. There was  no evidence for stenosis. There was no significant regurgitation.    TRICUSPID VALVE: The valve structure was normal. There was normal leaflet separation. DOPPLER: The transtricuspid velocity was within the normal range. There was no evidence for stenosis. There was trace regurgitation.    PULMONIC VALVE: Leaflets exhibited normal thickness, no calcification, and normal cuspal separation. DOPPLER: The transpulmonic velocity was within the normal range. There was no significant regurgitation.    PERICARDIUM: There was no pericardial effusion. The pericardium was normal in  "appearance.    AORTA: The root exhibited normal size.    SYSTEMIC VEINS: IVC: The inferior vena cava was normal in size.    SYSTEM MEASUREMENT TABLES    2D  %FS: 42.17 %  AVC: 346.25 ms  Ao Diam: 3.12 cm  EDV(Teich): 56.63 ml  EF(Teich): 74.05 %  ESV(Teich): 14.7 ml  IVSd: 2.25 cm  LA Diam: 3.62 cm  LAAs A4C: 16.92 cm2  LAESV A-L A4C: 46.49 ml  LAESV MOD A4C: 44.36 ml  LALs A4C: 5.23 cm  LVEDV MOD A4C: 54.1 ml  LVEF MOD A4C: 69.1 %  LVESV MOD A4C: 16.72 ml  LVIDd: 3.66 cm  LVIDs: 2.12 cm  LVLd A4C: 7.68 cm  LVLs A4C: 5.29 cm  LVPWd: 0.96 cm  RAAs A4C: 14.61 cm2  RAESV A-L: 36.79 ml  RAESV MOD: 35.1 ml  RALs: 4.93 cm  RVIDd: 3.24 cm  RWT: 0.52  SV MOD A4C: 37.39 ml  SV(Teich): 41.93 ml    MM  TAPSE: 2.19 cm    [facility] Accredited Echocardiography Laboratory    Prepared and electronically signed by    Patel Nolasco MD  Signed 01-Oct-2021 14:17:05    Name: Kandis Martin                       : 1970  MRN: [id number]                       Age: 54 y.o.  Patient Status: Outpatient          Gender: female  ECHO Complete With Contrast If Indicated    Height: 5' 9\" (1.753 m)   Weight: 77.6 kg (171 lb)   BSA: 1.93 m²   Blood Pressure: 129/82    Date of Study: 24   Ordering Provider: Ni Cabrera DO   Clinical Indications: HOCM (hypertrophic obstructive cardiomyopathy) (HCC) [I42.1 (ICD-10-CM)]       Reading Physicians  Performing Staff   Cardiology: Johnny Barboza MD    Tech: Peggy Mittal RS         Vitals    Height Weight BSA (Calculated - m2) BP Pulse   5' 9\" (1.753 m) 77.6 kg (171 lb) 1.93 sq meters 129/82 82     PACS Images     Show images for Echo complete w/ contrast if indicated  Study Details    This transthoracic echocardiogram was performed in the echo lab. This was a routine, outpatient study. Study quality was adequate. A complete 2D, color flow Doppler, spectral Doppler, 2D, color flow Doppler, spectral Doppler and strain transthoracic echocardiogram was " performed.  The apical, parasternal, subcostal and suprasternal views were obtained.     History    HOCM, Hyperthyroidism, Syncope and collapse     Interpretation Summary  Show Result Comparison     Left Ventricle: Wall thickness is severely increased. There is severe asymmetric hypertrophy of the septal wall (maximum wall thickness 23 mm). The left ventricular ejection fraction is 67%. Systolic function is vigorous. Global longitudinal strain is reduced at -16%. Wall motion is normal. Diastolic function is mildly abnormal, consistent with grade I (abnormal) relaxation. There is no LV dynamic obstruction with a peak gradient of 10.0 mmHg at rest and 25.0 mmHg with Valsalva.    Left Atrium: The atrium is mildly dilated.    Mitral Valve: There is mild regurgitation. There is systolic anterior motion without late peaking gradient.     Strain was performed to quantify interventricular dyssynchrony and evaluate components of myocardial function due to  HCM. Results from the utilization of Strain Analysis are listed in the report below.     Findings    Left Ventricle Left ventricular cavity size is small. Wall thickness is severely increased. There is severe asymmetric hypertrophy of the septal wall (maximum wall thickness 23 mm). The left ventricular ejection fraction is 67%. Systolic function is vigorous. Global longitudinal strain is reduced at -16%.  Wall motion is normal. Diastolic function is mildly abnormal, consistent with grade I (abnormal) relaxation.  There is no LV dynamic obstruction with a peak gradient of 10.0 mmHg at rest and 25.0 mmHg with Valsalva.   Right Ventricle Right ventricular cavity size is normal. Systolic function is normal. Wall thickness is normal.   Left Atrium The atrium is mildly dilated.   Right Atrium The atrium is normal in size.   Aortic Valve The aortic valve is trileaflet. The leaflets are not thickened. The leaflets are not calcified. The leaflets exhibit normal mobility. There is  no evidence of regurgitation. The aortic valve has no significant stenosis.   Mitral Valve Mitral valve structure is normal.  There is mild regurgitation. There is no evidence of stenosis. There is systolic anterior motion without late peaking gradient.   Tricuspid Valve Tricuspid valve structure is normal. There is trace regurgitation. There is no evidence of stenosis. The right ventricular systolic pressure is normal. The estimated right ventricular systolic pressure is 29.00 mmHg.   Pulmonic Valve Pulmonic valve structure is normal. There is trace regurgitation. There is no evidence of stenosis.   Ascending Aorta The aortic root is normal in size.   IVC/SVC The right atrial pressure is estimated at 3.0 mmHg. The inferior vena cava is normal in size.   Pericardium There is no pericardial effusion. The pericardium is normal in appearance.     Left Ventricle Measurements    Function/Volumes   A4C EF 67 %         LVOT stroke volume 80.74         LVOT stroke volume index 39.9 ml/m2         Left ventricular stroke volume (2D) 38 mL         LVOT Cardiac Output 5.28 l/min         LVOT Cardiac Index 2.73 l/min/m2         Dimensions   LVIDd 3.6 cm         LVIDS 2.2 cm         IVSd 2 cm         LVPWd 1.1 cm         LVOT area 3.8 cm2         FS 39         Diastolic Filling   MV E' Tissue Velocity Septal 5 cm/s         MV E' Tissue Velocity Lateral 7 cm/s         LA Volume Index (BP) 30.1 mL/m2         E/A ratio 0.55         E wave deceleration time 179 ms         MV Peak E Sebastien 47 cm/s         MV Peak A Sebastien 0.85 m/s         Strain   GLS -16 %          Report Measurements   AV LVOT peak gradient 4 mmHg         Other Measurements   Peak gradient (Rest) 10 mmHg         Peak Gradient (Valsalva) 25 mmHg              Interventricular Septum Measurements    Shunt Ratio   LVOT peak VTI 21.25 cm         LVOT peak sebastien 0.97 m/s              Right Ventricle Measurements    Dimensions   RVID d 3.3 cm         Tricuspid annular plane  systolic excursion 2.1 cm               Left Atrium Measurements    Dimensions   LA size 4.1 cm         LA length (A2C) 5.1 cm         Volumes   LA volume (BP) 58 mL         LA Volume Index (BP) 30.1 mL/m2               Right Atrium Measurements    Dimensions   RAA A4C 15 cm2               Atrial Septum Measurements    Shunt Ratio   LVOT peak VTI 21.25 cm         LVOT peak sebastien 0.97 m/s               Aortic Valve Measurements    Stenosis   LVOT peak sebastien 0.97 m/s         LVOT peak VTI 21.25 cm         LVOT mn grad 2 mmHg         AV peak gradient 5 mmHg         AV LVOT peak gradient 4 mmHg         Area/Dimensions   AV area peak sebastien 3.4 cm2         LVOT diameter 2.2 cm         LVOT area 3.8 cm2               Mitral Valve Measurements    Stenosis   MV stenosis pressure 1/2 time 52 ms         MV valve area p 1/2 method 4.23               Tricuspid Valve Measurements    RVSP Parameters   TR Peak Sebastien 2.6 m/s         Est. RA pres 3 mmHg         Triscuspid Valve Regurgitation Peak Gradient 26 mmHg         Right Ventricular Peak Systolic Pressure 29 mmHg               Aorta Measurements    Aortic Dimensions   Ao root 3.3 cm         Asc Ao 3.2 cm               IVC/SVC Measurements    IVC/SVC   Est. RA pres 3 mmHg              Exam Details    Performed Procedure Technologist Supporting Staff Performing Physician   Echo complete w/ strain JOEL Romero            Appointment Date/Status Modality Department    2024     Completed [facility] ECHO 2 [facility] CAR NON INV           Begin Exam End Exam  End Exam Questionnaires   2024  8:04 AM 2024  8:50 AM  PATIENT EDUCATION            All Reviewers List    Johnny Barboza MD on 3/1/2024  1:31 PM   Ni Cabrera DO on 2024 10:27 PM     Signed    Electronically signed by Johnny Barboza MD on 24 at 1105 EST     Name: Kandis Martin                       : 1970  MRN: [id number]                       Age: 54 y.o.  Patient Status: Outpatient        "   Gender: female  ECHO Complete With Contrast If Indicated    Height: 5' 9\" (1.753 m)   Weight: 71.3 kg (157 lb 3.2 oz)   BSA: 1.86 m²   Blood Pressure: 112/76    Date of Study: 3/18/25   Ordering Provider: Johnny Barboza MD   Clinical Indications: HOCM (hypertrophic obstructive cardiomyopathy) (HCC) [I42.1 (ICD-10-CM)]       Reading Physicians  Performing Staff   Cardiology: Johnny Barboza MD    Tech: Madiha Castillo         Vitals    Height Weight BSA (Calculated - m2) BP Pulse   5' 9\" (1.753 m) 71.3 kg (157 lb 3.2 oz) 1.86 sq meters 112/76 52     PACS Images     Show images for Echo complete w/ contrast if indicated  Study Details    This transthoracic echocardiogram was performed in the echo lab. This was a routine, outpatient study. Study quality was adequate. This was a technically difficult study due to lung interference. A complete 2D, color flow Doppler, spectral Doppler, 2D, color flow Doppler and spectral Doppler transthoracic echocardiogram was performed.  The apical, parasternal, subcostal and suprasternal views were obtained.     History    HOCM, Hyperthyroidism, Syncope and collapse     Interpretation Summary  Show Result Comparison     Left Ventricle: Wall thickness is severely increased. There is severe asymmetric hypertrophy of the septal wall (maximum wall thickness 23 mm). The left ventricular ejection fraction is 66%. Systolic function is vigorous. Global longitudinal strain is reduced at -15%. Wall motion is normal. Diastolic function is mildly abnormal, consistent with grade I (abnormal) relaxation. There is no LV dynamic obstruction.    Left Atrium: The atrium is mildly dilated.    Mitral Valve: There is mild regurgitation. There is systolic anterior motion without late peaking gradient.     Strain was performed to quantify interventricular dyssynchrony and evaluate components of myocardial function due to  HCM. Results from the utilization of Strain Analysis are listed in the report " below.     Findings    Left Ventricle Left ventricular cavity size is small. Wall thickness is severely increased. There is severe asymmetric hypertrophy of the septal wall (maximum wall thickness 23 mm). The left ventricular ejection fraction is 66%. Systolic function is vigorous. Global longitudinal strain is reduced at -15%.  Wall motion is normal. Diastolic function is mildly abnormal, consistent with grade I (abnormal) relaxation.  There is no LV dynamic obstruction.   Right Ventricle Right ventricular cavity size is normal. Systolic function is normal. Wall thickness is normal.   Left Atrium The atrium is mildly dilated.   Right Atrium The atrium is normal in size.   Aortic Valve The aortic valve is trileaflet. The leaflets are not thickened. The leaflets are not calcified. The leaflets exhibit normal mobility. There is no evidence of regurgitation. The aortic valve has no significant stenosis.   Mitral Valve Mitral valve structure is normal.  There is mild regurgitation. There is no evidence of stenosis. There is systolic anterior motion without late peaking gradient.   Tricuspid Valve Tricuspid valve structure is normal. There is trace regurgitation. There is no evidence of stenosis. The right ventricular systolic pressure is normal.   Pulmonic Valve Pulmonic valve structure is normal. There is trace regurgitation. There is no evidence of stenosis.   Ascending Aorta The aortic root is normal in size.   IVC/SVC The inferior vena cava is normal in size.   Pericardium There is no pericardial effusion. The pericardium is normal in appearance.     Left Ventricle Measurements    Function/Volumes   A4C EF 66 %         LVOT stroke volume 80.21         LVOT stroke volume index 40.6 ml/m2         Left ventricular stroke volume (2D) 50 mL         LVOT Cardiac Output 5.64 l/min         LVOT Cardiac Index 2.94 l/min/m2         Dimensions   LVIDd 3.9 cm         LVIDS 2.2 cm         IVSd 1.8 cm         LVPWd 1.1 cm          LVOT area 3.8 cm2         FS 44         Diastolic Filling   MV E' Tissue Velocity Septal 6 cm/s         MV E' Tissue Velocity Lateral 6 cm/s         LA Volume Index (BP) 23.4 mL/m2         E/A ratio 0.43         E wave deceleration time 138 ms         MV Peak E Sebastien 35 cm/s         MV Peak A Sebastien 0.82 m/s         Strain   GLS -15 %          Report Measurements   AV LVOT peak gradient 5 mmHg              Interventricular Septum Measurements    Shunt Ratio   LVOT peak VTI 21.11 cm         LVOT peak sebastien 1.06 m/s              Right Ventricle Measurements    Dimensions   RVID d 3.6 cm         Tricuspid annular plane systolic excursion 1.5 cm               Left Atrium Measurements    Dimensions   LA size 3.8 cm         LA length (A2C) 5.1 cm         Volumes   LA volume (BP) 45 mL         LA Volume Index (BP) 23.4 mL/m2               Right Atrium Measurements    Dimensions   RA 2D Volume 35 mL         RAA A4C 14.1 cm2               Atrial Septum Measurements    Shunt Ratio   LVOT peak VTI 21.11 cm         LVOT peak sebastien 1.06 m/s               Aortic Valve Measurements    Stenosis   Aortic valve peak velocity 1.27 m/s         LVOT peak sebastien 1.06 m/s         Ao VTI 26.73 cm         LVOT peak VTI 21.11 cm         AV mean gradient 4 mmHg         LVOT mn grad 3 mmHg         AV peak gradient 6 mmHg         AV LVOT peak gradient 5 mmHg         Area/Dimensions   DVI 0.79         AV valve area 3 cm2         AV area by cont VTI 3 cm2         AV area peak sebastien 3.2 cm2         LVOT diameter 2.2 cm         LVOT area 3.8 cm2               Mitral Valve Measurements    Stenosis   MV stenosis pressure 1/2 time 40 ms         MV valve area p 1/2 method 5.5               Aorta Measurements    Aortic Dimensions   Ao root 3.2 cm         Asc Ao 3.5 cm               Exam Details    Performed Procedure Technologist Supporting Staff Performing Physician   Echo complete w/ strain Madiha Castillo            Appointment Date/Status Modality Department     3/18/2025     Arrived [facility] ECHO 1 [facility] CAR NON INV           Begin Exam End Exam  End Exam Questionnaires   3/18/2025  9:20 AM 3/18/2025 10:14 AM  PATIENT EDUCATION            All Reviewers List    Johnny Barboza MD on 3/19/2025  2:43 PM     Signed    Electronically signed by Johnny Barboza MD on 3/18/25 at 1242 EDT     Counseling / Coordination of Care  Total time spent today 45 minutes.  Greater than 50% of total time was spent with the patient and / or family counseling and / or coordination of care.

## 2025-03-18 ENCOUNTER — HOSPITAL ENCOUNTER (OUTPATIENT)
Dept: NON INVASIVE DIAGNOSTICS | Facility: CLINIC | Age: 55
Discharge: HOME | End: 2025-03-18
Payer: COMMERCIAL

## 2025-03-18 ENCOUNTER — OFFICE VISIT (OUTPATIENT)
Dept: CARDIOLOGY CLINIC | Facility: CLINIC | Age: 55
End: 2025-03-18
Payer: COMMERCIAL

## 2025-03-18 VITALS
DIASTOLIC BLOOD PRESSURE: 76 MMHG | SYSTOLIC BLOOD PRESSURE: 112 MMHG | HEIGHT: 69 IN | BODY MASS INDEX: 23.25 KG/M2 | HEART RATE: 52 BPM | WEIGHT: 157 LBS

## 2025-03-18 VITALS
OXYGEN SATURATION: 99 % | HEART RATE: 52 BPM | DIASTOLIC BLOOD PRESSURE: 76 MMHG | WEIGHT: 157.2 LBS | BODY MASS INDEX: 23.28 KG/M2 | HEIGHT: 69 IN | SYSTOLIC BLOOD PRESSURE: 112 MMHG

## 2025-03-18 DIAGNOSIS — R55 SYNCOPE AND COLLAPSE: ICD-10-CM

## 2025-03-18 DIAGNOSIS — I42.1 HOCM (HYPERTROPHIC OBSTRUCTIVE CARDIOMYOPATHY) (HCC): Primary | ICD-10-CM

## 2025-03-18 DIAGNOSIS — I42.1 HOCM (HYPERTROPHIC OBSTRUCTIVE CARDIOMYOPATHY) (HCC): ICD-10-CM

## 2025-03-18 DIAGNOSIS — R94.31 QT PROLONGATION: ICD-10-CM

## 2025-03-18 LAB
AORTIC ROOT: 3.2 CM
AORTIC VALVE MEAN VELOCITY: 9.3 M/S
ASCENDING AORTA: 3.5 CM
AV AREA BY CONTINUOUS VTI: 3 CM2
AV AREA PEAK VELOCITY: 3.2 CM2
AV LVOT MEAN GRADIENT: 3 MMHG
AV LVOT PEAK GRADIENT: 5 MMHG
AV MEAN PRESS GRAD SYS DOP V1V2: 4 MMHG
AV ORIFICE AREA US: 3 CM2
AV PEAK GRADIENT: 6 MMHG
AV VELOCITY RATIO: 0.79
AV VMAX SYS DOP: 1.27 M/S
BSA FOR ECHO PROCEDURE: 1.86 M2
DOP CALC AO VTI: 26.73 CM
DOP CALC LVOT AREA: 3.8 CM2
DOP CALC LVOT CARDIAC INDEX: 2.94 L/MIN/M2
DOP CALC LVOT CARDIAC OUTPUT: 5.64 L/MIN
DOP CALC LVOT DIAMETER: 2.2 CM
DOP CALC LVOT PEAK VEL VTI: 21.11 CM
DOP CALC LVOT PEAK VEL: 1.06 M/S
DOP CALC LVOT STROKE INDEX: 40.6 ML/M2
DOP CALC LVOT STROKE VOLUME: 80.21
E WAVE DECELERATION TIME: 138 MS
E/A RATIO: 0.43
FRACTIONAL SHORTENING: 44 (ref 28–44)
GLOBAL LONGITUIDAL STRAIN: -15 %
INTERVENTRICULAR SEPTUM IN DIASTOLE (PARASTERNAL SHORT AXIS VIEW): 1.8 CM
INTERVENTRICULAR SEPTUM: 1.8 CM (ref 0.6–1.1)
LAAS-AP2: 16.9 CM2
LAAS-AP4: 17 CM2
LEFT ATRIUM SIZE: 3.8 CM
LEFT ATRIUM VOLUME (MOD BIPLANE): 45 ML
LEFT ATRIUM VOLUME INDEX (MOD BIPLANE): 23.4 ML/M2
LEFT INTERNAL DIMENSION IN SYSTOLE: 2.2 CM (ref 2.1–4)
LEFT VENTRICULAR INTERNAL DIMENSION IN DIASTOLE: 3.9 CM (ref 3.5–6)
LEFT VENTRICULAR POSTERIOR WALL IN END DIASTOLE: 1.1 CM
LEFT VENTRICULAR STROKE VOLUME: 50 ML
LV EF US.2D.A4C+ESTIMATED: 66 %
LVSV (TEICH): 50 ML
MV E'TISSUE VEL-LAT: 6 CM/S
MV E'TISSUE VEL-SEP: 6 CM/S
MV PEAK A VEL: 0.82 M/S
MV PEAK E VEL: 35 CM/S
MV STENOSIS PRESSURE HALF TIME: 40 MS
MV VALVE AREA P 1/2 METHOD: 5.5
RIGHT ATRIAL 2D VOLUME: 35 ML
RIGHT ATRIUM AREA SYSTOLE A4C: 14.1 CM2
RIGHT VENTRICLE ID DIMENSION: 3.6 CM
SL CV LEFT ATRIUM LENGTH A2C: 5.1 CM
SL CV LV EF: 66
SL CV PED ECHO LEFT VENTRICLE DIASTOLIC VOLUME (MOD BIPLANE) 2D: 67 ML
SL CV PED ECHO LEFT VENTRICLE SYSTOLIC VOLUME (MOD BIPLANE) 2D: 17 ML
TRICUSPID ANNULAR PLANE SYSTOLIC EXCURSION: 1.5 CM

## 2025-03-18 PROCEDURE — 93356 MYOCRD STRAIN IMG SPCKL TRCK: CPT

## 2025-03-18 PROCEDURE — 93356 MYOCRD STRAIN IMG SPCKL TRCK: CPT | Performed by: INTERNAL MEDICINE

## 2025-03-18 PROCEDURE — 93306 TTE W/DOPPLER COMPLETE: CPT

## 2025-03-18 PROCEDURE — 99215 OFFICE O/P EST HI 40 MIN: CPT | Performed by: INTERNAL MEDICINE

## 2025-03-18 PROCEDURE — 93306 TTE W/DOPPLER COMPLETE: CPT | Performed by: INTERNAL MEDICINE

## 2025-03-18 RX ORDER — TIRZEPATIDE 2.5 MG/.5ML
INJECTION, SOLUTION SUBCUTANEOUS
COMMUNITY
Start: 2025-03-03

## 2025-03-18 RX ORDER — TIRZEPATIDE 5 MG/.5ML
INJECTION, SOLUTION SUBCUTANEOUS
COMMUNITY
Start: 2025-03-13

## 2025-03-18 RX ORDER — DEXTROAMPHETAMINE SACCHARATE, AMPHETAMINE ASPARTATE, DEXTROAMPHETAMINE SULFATE, AND AMPHETAMINE SULFATE 2.5; 2.5; 2.5; 2.5 MG/1; MG/1; MG/1; MG/1
TABLET ORAL
COMMUNITY
Start: 2025-01-05

## 2025-03-18 RX ORDER — TRIAMCINOLONE ACETONIDE 1 MG/G
OINTMENT TOPICAL
COMMUNITY
Start: 2025-03-05

## 2025-06-01 DIAGNOSIS — I42.1 HOCM (HYPERTROPHIC OBSTRUCTIVE CARDIOMYOPATHY) (HCC): ICD-10-CM

## 2025-06-02 RX ORDER — DILTIAZEM HYDROCHLORIDE 180 MG/1
180 CAPSULE, COATED, EXTENDED RELEASE ORAL DAILY
Qty: 30 CAPSULE | Refills: 5 | Status: SHIPPED | OUTPATIENT
Start: 2025-06-02

## 2025-06-26 DIAGNOSIS — I42.1 HOCM (HYPERTROPHIC OBSTRUCTIVE CARDIOMYOPATHY) (HCC): ICD-10-CM

## 2025-06-27 RX ORDER — DILTIAZEM HYDROCHLORIDE 180 MG/1
180 CAPSULE, COATED, EXTENDED RELEASE ORAL DAILY
Qty: 90 CAPSULE | Refills: 1 | Status: SHIPPED | OUTPATIENT
Start: 2025-06-27